# Patient Record
Sex: MALE | Race: WHITE | NOT HISPANIC OR LATINO
[De-identification: names, ages, dates, MRNs, and addresses within clinical notes are randomized per-mention and may not be internally consistent; named-entity substitution may affect disease eponyms.]

---

## 2018-02-20 ENCOUNTER — APPOINTMENT (OUTPATIENT)
Dept: FAMILY MEDICINE | Facility: CLINIC | Age: 58
End: 2018-02-20
Payer: COMMERCIAL

## 2018-02-20 VITALS
BODY MASS INDEX: 31.39 KG/M2 | WEIGHT: 200 LBS | HEART RATE: 88 BPM | DIASTOLIC BLOOD PRESSURE: 90 MMHG | HEIGHT: 67 IN | SYSTOLIC BLOOD PRESSURE: 158 MMHG

## 2018-02-20 DIAGNOSIS — Z87.19 PERSONAL HISTORY OF OTHER DISEASES OF THE DIGESTIVE SYSTEM: ICD-10-CM

## 2018-02-20 DIAGNOSIS — Z82.49 FAMILY HISTORY OF ISCHEMIC HEART DISEASE AND OTHER DISEASES OF THE CIRCULATORY SYSTEM: ICD-10-CM

## 2018-02-20 DIAGNOSIS — Z87.09 PERSONAL HISTORY OF OTHER DISEASES OF THE RESPIRATORY SYSTEM: ICD-10-CM

## 2018-02-20 DIAGNOSIS — H93.19 TINNITUS, UNSPECIFIED EAR: ICD-10-CM

## 2018-02-20 DIAGNOSIS — E66.9 OBESITY, UNSPECIFIED: ICD-10-CM

## 2018-02-20 DIAGNOSIS — J30.2 OTHER SEASONAL ALLERGIC RHINITIS: ICD-10-CM

## 2018-02-20 PROCEDURE — 99204 OFFICE O/P NEW MOD 45 MIN: CPT | Mod: 25

## 2018-02-20 PROCEDURE — 93000 ELECTROCARDIOGRAM COMPLETE: CPT

## 2018-02-21 PROBLEM — E66.9 OBESITY: Status: ACTIVE | Noted: 2018-02-21

## 2018-03-15 ENCOUNTER — APPOINTMENT (OUTPATIENT)
Dept: FAMILY MEDICINE | Facility: CLINIC | Age: 58
End: 2018-03-15
Payer: COMMERCIAL

## 2018-03-15 VITALS
BODY MASS INDEX: 31.55 KG/M2 | SYSTOLIC BLOOD PRESSURE: 130 MMHG | WEIGHT: 201 LBS | HEART RATE: 84 BPM | HEIGHT: 67 IN | DIASTOLIC BLOOD PRESSURE: 80 MMHG

## 2018-03-15 DIAGNOSIS — F41.9 ANXIETY DISORDER, UNSPECIFIED: ICD-10-CM

## 2018-03-15 PROCEDURE — 99215 OFFICE O/P EST HI 40 MIN: CPT | Mod: 25

## 2018-03-15 PROCEDURE — G0444 DEPRESSION SCREEN ANNUAL: CPT | Mod: 59

## 2018-03-15 PROCEDURE — G0447 BEHAVIOR COUNSEL OBESITY 15M: CPT

## 2018-03-15 PROCEDURE — 36415 COLL VENOUS BLD VENIPUNCTURE: CPT

## 2018-03-15 PROCEDURE — G0442 ANNUAL ALCOHOL SCREEN 15 MIN: CPT

## 2018-03-15 PROCEDURE — G0443: CPT

## 2018-03-15 PROCEDURE — 99396 PREV VISIT EST AGE 40-64: CPT | Mod: 25

## 2018-03-16 LAB
25(OH)D3 SERPL-MCNC: 23.8 NG/ML
ALBUMIN SERPL ELPH-MCNC: 4.6 G/DL
ALP BLD-CCNC: 79 U/L
ALT SERPL-CCNC: 57 U/L
ANION GAP SERPL CALC-SCNC: 14 MMOL/L
AST SERPL-CCNC: 32 U/L
BASOPHILS # BLD AUTO: 0.02 K/UL
BASOPHILS NFR BLD AUTO: 0.3 %
BILIRUB SERPL-MCNC: 0.8 MG/DL
BUN SERPL-MCNC: 20 MG/DL
CALCIUM SERPL-MCNC: 9 MG/DL
CHLORIDE SERPL-SCNC: 102 MMOL/L
CHOLEST SERPL-MCNC: 211 MG/DL
CHOLEST/HDLC SERPL: 5.4 RATIO
CO2 SERPL-SCNC: 27 MMOL/L
CREAT SERPL-MCNC: 1.12 MG/DL
EOSINOPHIL # BLD AUTO: 0.1 K/UL
EOSINOPHIL NFR BLD AUTO: 1.4 %
FERRITIN SERPL-MCNC: 627 NG/ML
FOLATE SERPL-MCNC: 14.3 NG/ML
GLUCOSE SERPL-MCNC: 98 MG/DL
HBA1C MFR BLD HPLC: 5.8 %
HCT VFR BLD CALC: 45.4 %
HDLC SERPL-MCNC: 39 MG/DL
HGB BLD-MCNC: 15.5 G/DL
IMM GRANULOCYTES NFR BLD AUTO: 0.9 %
IRON SATN MFR SERPL: 32 %
IRON SERPL-MCNC: 91 UG/DL
LDLC SERPL CALC-MCNC: 133 MG/DL
LYMPHOCYTES # BLD AUTO: 1.93 K/UL
LYMPHOCYTES NFR BLD AUTO: 27.9 %
MAN DIFF?: NORMAL
MCHC RBC-ENTMCNC: 31.8 PG
MCHC RBC-ENTMCNC: 34.1 GM/DL
MCV RBC AUTO: 93 FL
MONOCYTES # BLD AUTO: 0.65 K/UL
MONOCYTES NFR BLD AUTO: 9.4 %
NEUTROPHILS # BLD AUTO: 4.15 K/UL
NEUTROPHILS NFR BLD AUTO: 60.1 %
PLATELET # BLD AUTO: 204 K/UL
POTASSIUM SERPL-SCNC: 4.3 MMOL/L
PROT SERPL-MCNC: 7.2 G/DL
PSA FREE FLD-MCNC: 21.6
PSA FREE SERPL-MCNC: 0.25 NG/ML
PSA SERPL-MCNC: 1.16 NG/ML
RBC # BLD: 4.88 M/UL
RBC # FLD: 12.9 %
SODIUM SERPL-SCNC: 143 MMOL/L
T4 FREE SERPL-MCNC: 1.2 NG/DL
TIBC SERPL-MCNC: 286 UG/DL
TRIGL SERPL-MCNC: 193 MG/DL
TSH SERPL-ACNC: 1.34 UIU/ML
UIBC SERPL-MCNC: 195 UG/DL
VIT B12 SERPL-MCNC: 1001 PG/ML
WBC # FLD AUTO: 6.91 K/UL

## 2018-03-26 ENCOUNTER — APPOINTMENT (OUTPATIENT)
Dept: ORTHOPEDIC SURGERY | Facility: CLINIC | Age: 58
End: 2018-03-26
Payer: COMMERCIAL

## 2018-03-26 VITALS
BODY MASS INDEX: 31.39 KG/M2 | HEART RATE: 99 BPM | HEIGHT: 67 IN | DIASTOLIC BLOOD PRESSURE: 103 MMHG | TEMPERATURE: 98.9 F | SYSTOLIC BLOOD PRESSURE: 188 MMHG | WEIGHT: 200 LBS

## 2018-03-26 DIAGNOSIS — M16.11 UNILATERAL PRIMARY OSTEOARTHRITIS, RIGHT HIP: ICD-10-CM

## 2018-03-26 DIAGNOSIS — Z96.642 PRESENCE OF LEFT ARTIFICIAL HIP JOINT: ICD-10-CM

## 2018-03-26 DIAGNOSIS — Z47.1 AFTERCARE FOLLOWING JOINT REPLACEMENT SURGERY: ICD-10-CM

## 2018-03-26 DIAGNOSIS — Z96.642 AFTERCARE FOLLOWING JOINT REPLACEMENT SURGERY: ICD-10-CM

## 2018-03-26 PROCEDURE — 99213 OFFICE O/P EST LOW 20 MIN: CPT

## 2018-03-26 PROCEDURE — 73502 X-RAY EXAM HIP UNI 2-3 VIEWS: CPT | Mod: LT

## 2018-03-27 ENCOUNTER — TRANSCRIPTION ENCOUNTER (OUTPATIENT)
Age: 58
End: 2018-03-27

## 2018-05-15 ENCOUNTER — APPOINTMENT (OUTPATIENT)
Dept: ORTHOPEDIC SURGERY | Facility: CLINIC | Age: 58
End: 2018-05-15
Payer: COMMERCIAL

## 2018-05-15 VITALS
BODY MASS INDEX: 31.39 KG/M2 | HEIGHT: 67 IN | SYSTOLIC BLOOD PRESSURE: 160 MMHG | HEART RATE: 100 BPM | WEIGHT: 200 LBS | DIASTOLIC BLOOD PRESSURE: 93 MMHG

## 2018-05-15 DIAGNOSIS — M75.52 BURSITIS OF LEFT SHOULDER: ICD-10-CM

## 2018-05-15 DIAGNOSIS — M77.11 LATERAL EPICONDYLITIS, RIGHT ELBOW: ICD-10-CM

## 2018-05-15 DIAGNOSIS — M25.521 PAIN IN RIGHT ELBOW: ICD-10-CM

## 2018-05-15 PROCEDURE — 99215 OFFICE O/P EST HI 40 MIN: CPT | Mod: 25

## 2018-05-15 PROCEDURE — 20610 DRAIN/INJ JOINT/BURSA W/O US: CPT | Mod: LT

## 2018-05-15 PROCEDURE — 73030 X-RAY EXAM OF SHOULDER: CPT | Mod: LT

## 2018-05-15 PROCEDURE — 20551 NJX 1 TENDON ORIGIN/INSJ: CPT | Mod: 59,RT

## 2018-07-10 ENCOUNTER — APPOINTMENT (OUTPATIENT)
Dept: FAMILY MEDICINE | Facility: CLINIC | Age: 58
End: 2018-07-10
Payer: COMMERCIAL

## 2018-07-10 VITALS
WEIGHT: 195 LBS | DIASTOLIC BLOOD PRESSURE: 95 MMHG | BODY MASS INDEX: 30.61 KG/M2 | SYSTOLIC BLOOD PRESSURE: 180 MMHG | HEART RATE: 106 BPM | HEIGHT: 67 IN

## 2018-07-10 PROCEDURE — 99214 OFFICE O/P EST MOD 30 MIN: CPT

## 2018-07-10 NOTE — HEALTH RISK ASSESSMENT
[Handling Complex Tasks] : difficulty handling complex tasks [None] : None [With Family] : lives with family [Employed] : employed [Feels Safe at Home] : Feels safe at home [Fully functional (bathing, dressing, toileting, transferring, walking, feeding)] : Fully functional (bathing, dressing, toileting, transferring, walking, feeding) [Fully functional (using the telephone, shopping, preparing meals, housekeeping, doing laundry, using] : Fully functional and needs no help or supervision to perform IADLs (using the telephone, shopping, preparing meals, housekeeping, doing laundry, using transportation, managing medications and managing finances) [Change in mental status noted] : No change in mental status noted [Language] : denies difficulty with language [Reports changes in hearing] : Reports no changes in hearing [Reports changes in dental health] : Reports no changes in dental health [ColonoscopyComments] : never

## 2018-07-10 NOTE — ASSESSMENT
[FreeTextEntry1] : uncontrolled  HTN- compliance discussed/decrease salty food/ increase amlodipine to 10 mg and add carvedilol to existing meds/ follow up in 2 weeks\par screen for colon ca- FIT/ GE referral- advsie to send it in.\par screen for prostates ca- PSAral\par alcohol drinker- drinks 4-5 /day advise to cut down no more then 2/day/discussed effects of alcohol on liver sathish with high ferritin and elevated lft\par elevated ferritin- Hematology consult\par prediabetes/ hyperlipedemia- advise die\par elevated LFT- US and refrain from alcohol\par Diet and Activity: - Choose lean meats and poultry without skin and prepare them without added saturated and trans \par fat. Eat fish at least,twice a week. Recent research shows that eating oily \par fish containing omega-3,fatty acids (for example, salmon, trout and herring) \par may help lower your risk of death from coronary artery disease. Select \par fat-free, 1 percent fat and low-fat dairy products. Cut back on foods \par containing partially hydrogenated vegetable oils to reduce trans fat in your \par diet. To lower cholesterol, reduce saturated fat to no more than 5 to 6 \par percent of total calories. For someone eating 2,000 calories a day, that’s \par about 13 grams of saturated fat. Cut back on beverages and foods with added \par sugars. Choose and prepare foods with little or no salt. To lower blood \par pressure, aim to eat no more than 2,400 milligrams of sodium per day. \par Reducing daily intake to 1,500 mg is desirable because it can lower blood \par pressure even further. If you drink alcohol, drink in moderation. That means \par one drink per day if youre a woman and two drinks per day if youre a \par man Follow the American Heart Association recommendations when you eat out, \par and keep an eye on your portion sizes.\par follow up in 3 months /prn

## 2018-07-10 NOTE — COUNSELING
[Weight management counseling provided] : Weight management [Healthy eating counseling provided] : healthy eating [Activity counseling provided] : activity [Behavioral health counseling provided] : behavioral health

## 2018-07-22 PROBLEM — J30.2 SEASONAL ALLERGIES: Status: ACTIVE | Noted: 2018-02-20

## 2018-07-24 ENCOUNTER — APPOINTMENT (OUTPATIENT)
Dept: FAMILY MEDICINE | Facility: CLINIC | Age: 58
End: 2018-07-24
Payer: COMMERCIAL

## 2018-07-24 VITALS
DIASTOLIC BLOOD PRESSURE: 100 MMHG | WEIGHT: 200 LBS | HEIGHT: 67 IN | SYSTOLIC BLOOD PRESSURE: 168 MMHG | HEART RATE: 98 BPM | BODY MASS INDEX: 31.39 KG/M2

## 2018-07-24 PROCEDURE — 99213 OFFICE O/P EST LOW 20 MIN: CPT

## 2018-07-24 NOTE — HISTORY OF PRESENT ILLNESS
[FreeTextEntry1] : HTN follow up [de-identified] : no complaints\par has h/o high LFt in the past\par drinks 4-5 drinks/day\par h/o high Bilirubin as well\par \par here for BP follow up eats out and eats salty food

## 2018-08-14 ENCOUNTER — APPOINTMENT (OUTPATIENT)
Dept: FAMILY MEDICINE | Facility: CLINIC | Age: 58
End: 2018-08-14
Payer: COMMERCIAL

## 2018-08-14 VITALS
HEIGHT: 67 IN | HEART RATE: 84 BPM | DIASTOLIC BLOOD PRESSURE: 88 MMHG | SYSTOLIC BLOOD PRESSURE: 138 MMHG | BODY MASS INDEX: 32.02 KG/M2 | WEIGHT: 204 LBS

## 2018-08-14 DIAGNOSIS — R79.89 OTHER SPECIFIED ABNORMAL FINDINGS OF BLOOD CHEMISTRY: ICD-10-CM

## 2018-08-14 PROCEDURE — 99214 OFFICE O/P EST MOD 30 MIN: CPT

## 2018-08-14 NOTE — ASSESSMENT
[FreeTextEntry1] :   HTN- better controlled continue current.\par compliance discussed/decrease salty food/ increase amlodipine to 10 mg and add carvedilol to existing meds/ follow up in 2 weeks\par screen for colon ca- FIT/ GE referral- advsie to send it in- advised to make appts.\par screen for prostates ca- PSAral\par alcohol drinker- drinks 4-5 /day advise to cut down no more then 2/day/discussed effects of alcohol on liver sathish with high ferritin and elevated lft- reiterated again- will draw lab.\par elevated ferritin- Hematology consult\par prediabetes/ hyperlipedemia- advise die\par elevated LFT- US and refrain from alcohol\par Diet and Activity: - Choose lean meats and poultry without skin and prepare them without added saturated and trans \par fat. Eat fish at least,twice a week. Recent research shows that eating oily \par fish containing omega-3,fatty acids (for example, salmon, trout and herring) \par may help lower your risk of death from coronary artery disease. Select \par fat-free, 1 percent fat and low-fat dairy products. Cut back on foods \par containing partially hydrogenated vegetable oils to reduce trans fat in your \par diet. To lower cholesterol, reduce saturated fat to no more than 5 to 6 \par percent of total calories. For someone eating 2,000 calories a day, that’s \par about 13 grams of saturated fat. Cut back on beverages and foods with added \par sugars. Choose and prepare foods with little or no salt. To lower blood \par pressure, aim to eat no more than 2,400 milligrams of sodium per day. \par Reducing daily intake to 1,500 mg is desirable because it can lower blood \par pressure even further. If you drink alcohol, drink in moderation. That means \par one drink per day if youre a woman and two drinks per day if youre a \par man Follow the American Heart Association recommendations when you eat out, \par and keep an eye on your portion sizes.\par follow up in 3 months /prn

## 2018-08-14 NOTE — HISTORY OF PRESENT ILLNESS
[FreeTextEntry1] : here for BP follow up [de-identified] : no complaints\par has h/o high LFt in the past\par drinks 4-5 drinks/day\par h/o high Bilirubin as well\par 07/2018- here for BP follow up\par declines any chest pain/headache or SOB\par did not take meds yet.\par 08/2018- here for BP follow up /has cut down on salt and doing well but continues to drink 6 beers/day may be more on the weekened. feels its not much and he is doing Okay.\par has not seen cardio/gastro and Not done FIT testing\par

## 2018-09-13 LAB — HEMOCCULT STL QL IA: NEGATIVE

## 2018-10-10 ENCOUNTER — RX RENEWAL (OUTPATIENT)
Age: 58
End: 2018-10-10

## 2018-10-16 ENCOUNTER — APPOINTMENT (OUTPATIENT)
Dept: FAMILY MEDICINE | Facility: CLINIC | Age: 58
End: 2018-10-16
Payer: COMMERCIAL

## 2018-10-16 VITALS
HEIGHT: 67 IN | HEART RATE: 80 BPM | SYSTOLIC BLOOD PRESSURE: 146 MMHG | DIASTOLIC BLOOD PRESSURE: 90 MMHG | BODY MASS INDEX: 32.02 KG/M2 | WEIGHT: 204 LBS

## 2018-10-16 PROCEDURE — 99214 OFFICE O/P EST MOD 30 MIN: CPT

## 2018-10-16 NOTE — ASSESSMENT
[FreeTextEntry1] :   HTN- un controlled - increase amlodipine to 10 mg and continue rest/ advise lab and follow up in3 months\par compliance discussed/decrease salty food/ increase amlodipine to 10 mg and add carvedilol to existing meds/ follow up in 2 weeks\par screen for colon ca- FIT/ GE referral- advsie to send it in- advised to make appts.\par screen for prostates ca- PSAral\par alcohol drinker- drinks 4-5 /day advise to cut down no more then 2/day/discussed effects of alcohol on liver sathish with high ferritin and elevated lft- reiterated again- will draw lab.\par elevated ferritin- Hematology consult\par prediabetes/ hyperlipedemia- advise die\par elevated LFT- US and refrain from alcohol\par Diet and Activity: - Choose lean meats and poultry without skin and prepare them without added saturated and trans \par fat. Eat fish at least,twice a week. Recent research shows that eating oily \par fish containing omega-3,fatty acids (for example, salmon, trout and herring) \par may help lower your risk of death from coronary artery disease. Select \par fat-free, 1 percent fat and low-fat dairy products. Cut back on foods \par containing partially hydrogenated vegetable oils to reduce trans fat in your \par diet. To lower cholesterol, reduce saturated fat to no more than 5 to 6 \par percent of total calories. For someone eating 2,000 calories a day, that’s \par about 13 grams of saturated fat. Cut back on beverages and foods with added \par sugars. Choose and prepare foods with little or no salt. To lower blood \par pressure, aim to eat no more than 2,400 milligrams of sodium per day. \par Reducing daily intake to 1,500 mg is desirable because it can lower blood \par pressure even further. If you drink alcohol, drink in moderation. That means \par one drink per day if youre a woman and two drinks per day if youre a \par man Follow the American Heart Association recommendations when you eat out, \par and keep an eye on your portion sizes.\par follow up in 3 months /prn

## 2018-10-16 NOTE — HEALTH RISK ASSESSMENT
[Change in mental status noted] : No change in mental status noted [Language] : denies difficulty with language [Handling Complex Tasks] : difficulty handling complex tasks [None] : None [With Family] : lives with family [Employed] : employed [Feels Safe at Home] : Feels safe at home [Fully functional (bathing, dressing, toileting, transferring, walking, feeding)] : Fully functional (bathing, dressing, toileting, transferring, walking, feeding) [Fully functional (using the telephone, shopping, preparing meals, housekeeping, doing laundry, using] : Fully functional and needs no help or supervision to perform IADLs (using the telephone, shopping, preparing meals, housekeeping, doing laundry, using transportation, managing medications and managing finances) [Reports changes in hearing] : Reports no changes in hearing [Reports changes in dental health] : Reports no changes in dental health [ColonoscopyComments] : never

## 2018-10-16 NOTE — HISTORY OF PRESENT ILLNESS
[FreeTextEntry1] : here for BP follow up/ has not done lab [de-identified] : no complaints\par has h/o high LFt in the past\par drinks 4-5 drinks/day\par h/o high Bilirubin as well\par 07/2018- here for BP follow up\par declines any chest pain/headache or SOB\par did not take meds yet.\par 08/2018- here for BP follow up /has cut down on salt and doing well but continues to drink 6 beers/day may be more on the weekened. feels its not much and he is doing Okay.\par has not seen cardio/gastro and Not done FIT testing\par 10/2018- no changes

## 2019-01-22 ENCOUNTER — APPOINTMENT (OUTPATIENT)
Dept: GASTROENTEROLOGY | Facility: CLINIC | Age: 59
End: 2019-01-22
Payer: COMMERCIAL

## 2019-01-22 VITALS
DIASTOLIC BLOOD PRESSURE: 84 MMHG | HEIGHT: 67 IN | RESPIRATION RATE: 16 BRPM | SYSTOLIC BLOOD PRESSURE: 136 MMHG | OXYGEN SATURATION: 100 % | HEART RATE: 72 BPM

## 2019-01-22 DIAGNOSIS — Z78.9 OTHER SPECIFIED HEALTH STATUS: ICD-10-CM

## 2019-01-22 PROCEDURE — 99204 OFFICE O/P NEW MOD 45 MIN: CPT

## 2019-01-22 NOTE — HISTORY OF PRESENT ILLNESS
[Wt Loss ___ Lbs] : no recent weight loss [de-identified] : This is a 58-year-old man with a past medical history of hypertension, prediabetes, and obesity who presents for evaluation for a screening colonoscopy.  The patient has never undergone a colonoscopy.  He denies any family history of colorectal cancer.  He denies any unintentional weight loss, rectal bleeding, abdominal pain, nausea, vomiting, diarrhea, or constipation.  He denies any changes in his bowel habits.  He does report occasional bloating.  He admits to drinking approximately 4-5 drinks nightly.  He has been told by other physicians that he needs to cut down on his alcohol consumption.

## 2019-01-22 NOTE — ASSESSMENT
[FreeTextEntry1] : CRC screening: The description of the colonoscopy procedure was discussed in depth as were the alternatives and risks, the alternatives including a barium enema, a CT scan, a CT colonography, or stool testing (hemoccult/FIT).  It was explained that although these alternatives may be useful and may give general information about problems within the colon, but they do not provide the in-depth information, direct visibility and the ability to resect polyps as a colonoscopy does.  The risks were thoroughly described and may include but are not limited to: bleeding (immediate or up to 14 days after the procedure), missed polyps and/or cancer that may not be seen during the procedure, rectal irritation, medication reaction (to sedation, or any other medications administered), irritation of the vein used for IV medication, infection, tear or perforation of the colon and rectum, abdominal bloating and cramping.  Additionally discussed were rare complications that may require additional treatment such as surgery, hospitalization, repeat endoscopy and/or blood transfusion.  Lastly, mentioned is a small risk of cardiopulmonary events such as loss of breathing and heart rhythm disturbances including rare cardiopulmonary arrest.\par \par Alcohol abuse/elevated LFTs: Counseled patient to significantly reduce alcohol intake.  He reported he was aware he drinks too much and intends to decrease his consumption.

## 2019-01-22 NOTE — CONSULT LETTER
[Dear  ___] : Dear  [unfilled], [Consult Letter:] : I had the pleasure of evaluating your patient, [unfilled]. [Please see my note below.] : Please see my note below. [Consult Closing:] : Thank you very much for allowing me to participate in the care of this patient.  If you have any questions, please do not hesitate to contact me. [FreeTextEntry3] : Very truly yours,\par \par KIMBERLY Riggins MD\par [facility]\par Gastroenterology at [place]\par [address], [address]\[place] [address]\par Tel [phone]\par Fax [phone]

## 2019-01-22 NOTE — PHYSICAL EXAM
[General Appearance - Alert] : alert [General Appearance - In No Acute Distress] : in no acute distress [Sclera] : the sclera and conjunctiva were normal [PERRL With Normal Accommodation] : pupils were equal in size, round, and reactive to light [Extraocular Movements] : extraocular movements were intact [Outer Ear] : the ears and nose were normal in appearance [Oropharynx] : the oropharynx was normal [Neck Appearance] : the appearance of the neck was normal [Neck Cervical Mass (___cm)] : no neck mass was observed [Jugular Venous Distention Increased] : there was no jugular-venous distention [Thyroid Diffuse Enlargement] : the thyroid was not enlarged [Thyroid Nodule] : there were no palpable thyroid nodules [] : no respiratory distress [Auscultation Breath Sounds / Voice Sounds] : lungs were clear to auscultation bilaterally [Heart Rate And Rhythm] : heart rate was normal and rhythm regular [Heart Sounds] : normal S1 and S2 [Heart Sounds Gallop] : no gallops [Murmurs] : no murmurs [Heart Sounds Pericardial Friction Rub] : no pericardial rub [Edema] : there was no peripheral edema [Bowel Sounds] : normal bowel sounds [Abdomen Soft] : soft [Abdomen Tenderness] : non-tender [Abdomen Mass (___ Cm)] : no abdominal mass palpated [Cervical Lymph Nodes Enlarged Posterior Bilaterally] : posterior cervical [Cervical Lymph Nodes Enlarged Anterior Bilaterally] : anterior cervical [Supraclavicular Lymph Nodes Enlarged Bilaterally] : supraclavicular [Skin Color & Pigmentation] : normal skin color and pigmentation [Skin Turgor] : normal skin turgor [No Focal Deficits] : no focal deficits [Oriented To Time, Place, And Person] : oriented to person, place, and time [Impaired Insight] : insight and judgment were intact [Affect] : the affect was normal [FreeTextEntry1] : liver edge palpable approximately 1.5 cm below the costophrenic margin; spleen not palpable

## 2019-01-28 ENCOUNTER — RX RENEWAL (OUTPATIENT)
Age: 59
End: 2019-01-28

## 2019-01-31 ENCOUNTER — APPOINTMENT (OUTPATIENT)
Dept: FAMILY MEDICINE | Facility: CLINIC | Age: 59
End: 2019-01-31
Payer: COMMERCIAL

## 2019-01-31 VITALS
BODY MASS INDEX: 32.33 KG/M2 | HEIGHT: 67 IN | HEART RATE: 76 BPM | DIASTOLIC BLOOD PRESSURE: 78 MMHG | WEIGHT: 206 LBS | SYSTOLIC BLOOD PRESSURE: 128 MMHG

## 2019-01-31 DIAGNOSIS — R73.03 PREDIABETES.: ICD-10-CM

## 2019-01-31 PROCEDURE — 99214 OFFICE O/P EST MOD 30 MIN: CPT

## 2019-01-31 NOTE — HISTORY OF PRESENT ILLNESS
[FreeTextEntry1] : here for BP follow up/ has not done lab [de-identified] : no complaints\par has h/o high LFt in the past\par drinks 4-5 drinks/day\par h/o high Bilirubin as well\par 07/2018- here for BP follow up\par declines any chest pain/headache or SOB\par did not take meds yet.\par 08/2018- here for BP follow up /has cut down on salt and doing well but continues to drink 6 beers/day may be more on the weekened. feels its not much and he is doing Okay.\par has not seen cardio/gastro and Not done FIT testing\par 10/2018- no changes\par 01/2019- feels okay/ cutting down alcohol\par saw gastro

## 2019-01-31 NOTE — ASSESSMENT
[FreeTextEntry1] :   HTN-  controlled - continue amlodipine to 10 mg and continue rest/ advise lab and follow up in3 months\par compliance discussed/decrease salty food/ increase amlodipine to 10 mg and add carvedilol to existing meds/ follow up in 2 weeks\par screen for colon ca- FIT/ GE referral- advsie to send it in- advised to make appts.\par screen for prostates ca- PSAral\par alcohol drinker- drinks 4-5 /day advise to cut down no more then 2/day/discussed effects of alcohol on liver sathish with high ferritin and elevated lft- reiterated again- will draw lab.\par elevated ferritin- Hematology consult\par prediabetes/ hyperlipedemia- advise die\par elevated LFT- US and refrain from alcohol\par Diet and Activity: - Choose lean meats and poultry without skin and prepare them without added saturated and trans \par fat. Eat fish at least,twice a week. Recent research shows that eating oily \par fish containing omega-3,fatty acids (for example, salmon, trout and herring) \par may help lower your risk of death from coronary artery disease. Select \par fat-free, 1 percent fat and low-fat dairy products. Cut back on foods \par containing partially hydrogenated vegetable oils to reduce trans fat in your \par diet. To lower cholesterol, reduce saturated fat to no more than 5 to 6 \par percent of total calories. For someone eating 2,000 calories a day, that’s \par about 13 grams of saturated fat. Cut back on beverages and foods with added \par sugars. Choose and prepare foods with little or no salt. To lower blood \par pressure, aim to eat no more than 2,400 milligrams of sodium per day. \par Reducing daily intake to 1,500 mg is desirable because it can lower blood \par pressure even further. If you drink alcohol, drink in moderation. That means \par one drink per day if youre a woman and two drinks per day if youre a \par man Follow the American Heart Association recommendations when you eat out, \par and keep an eye on your portion sizes.\par follow up in 3 months /prn

## 2019-02-15 ENCOUNTER — APPOINTMENT (OUTPATIENT)
Dept: GASTROENTEROLOGY | Facility: GI CENTER | Age: 59
End: 2019-02-15

## 2019-02-22 ENCOUNTER — APPOINTMENT (OUTPATIENT)
Dept: GASTROENTEROLOGY | Facility: GI CENTER | Age: 59
End: 2019-02-22
Payer: COMMERCIAL

## 2019-02-22 ENCOUNTER — RESULT REVIEW (OUTPATIENT)
Age: 59
End: 2019-02-22

## 2019-02-22 ENCOUNTER — OUTPATIENT (OUTPATIENT)
Dept: OUTPATIENT SERVICES | Facility: HOSPITAL | Age: 59
LOS: 1 days | End: 2019-02-22
Payer: COMMERCIAL

## 2019-02-22 DIAGNOSIS — D12.2 BENIGN NEOPLASM OF ASCENDING COLON: ICD-10-CM

## 2019-02-22 DIAGNOSIS — K64.4 RESIDUAL HEMORRHOIDAL SKIN TAGS: ICD-10-CM

## 2019-02-22 DIAGNOSIS — Z12.11 ENCOUNTER FOR SCREENING FOR MALIGNANT NEOPLASM OF COLON: ICD-10-CM

## 2019-02-22 PROCEDURE — 45385 COLONOSCOPY W/LESION REMOVAL: CPT

## 2019-02-22 PROCEDURE — 88305 TISSUE EXAM BY PATHOLOGIST: CPT | Mod: 26

## 2019-02-22 PROCEDURE — 88305 TISSUE EXAM BY PATHOLOGIST: CPT

## 2019-02-22 RX ORDER — SODIUM SULFATE, POTASSIUM SULFATE, MAGNESIUM SULFATE 17.5; 3.13; 1.6 G/ML; G/ML; G/ML
17.5-3.13-1.6 SOLUTION, CONCENTRATE ORAL
Qty: 1 | Refills: 0 | Status: COMPLETED | COMMUNITY
Start: 2019-01-22 | End: 2019-02-22

## 2019-02-22 NOTE — PHYSICAL EXAM
[General Appearance - Alert] : alert [General Appearance - In No Acute Distress] : in no acute distress [Sclera] : the sclera and conjunctiva were normal [PERRL With Normal Accommodation] : pupils were equal in size, round, and reactive to light [Extraocular Movements] : extraocular movements were intact [Outer Ear] : the ears and nose were normal in appearance [Oropharynx] : the oropharynx was normal [Neck Appearance] : the appearance of the neck was normal [Neck Cervical Mass (___cm)] : no neck mass was observed [Jugular Venous Distention Increased] : there was no jugular-venous distention [Thyroid Diffuse Enlargement] : the thyroid was not enlarged [Thyroid Nodule] : there were no palpable thyroid nodules [] : no respiratory distress [Auscultation Breath Sounds / Voice Sounds] : lungs were clear to auscultation bilaterally [Heart Rate And Rhythm] : heart rate was normal and rhythm regular [Heart Sounds] : normal S1 and S2 [Heart Sounds Gallop] : no gallops [Murmurs] : no murmurs [Heart Sounds Pericardial Friction Rub] : no pericardial rub [Edema] : there was no peripheral edema [Bowel Sounds] : normal bowel sounds [Abdomen Soft] : soft [Abdomen Tenderness] : non-tender [Abdomen Mass (___ Cm)] : no abdominal mass palpated [FreeTextEntry1] : liver edge palpable approximately 1.5 cm below the costophrenic margin; spleen not palpable [Cervical Lymph Nodes Enlarged Posterior Bilaterally] : posterior cervical [Cervical Lymph Nodes Enlarged Anterior Bilaterally] : anterior cervical [Supraclavicular Lymph Nodes Enlarged Bilaterally] : supraclavicular [Skin Color & Pigmentation] : normal skin color and pigmentation [Skin Turgor] : normal skin turgor [No Focal Deficits] : no focal deficits [Oriented To Time, Place, And Person] : oriented to person, place, and time [Impaired Insight] : insight and judgment were intact [Affect] : the affect was normal

## 2019-02-22 NOTE — ASSESSMENT
[FreeTextEntry1] : One small polyp resected and retrieved by cold snare polypectomy.  External hemorrhoids on KARIN and retroflexed view.  Poor bowel prep.\par \par Follow up pathology, repeat colonoscopy in 1 year due to poor prep.

## 2019-02-22 NOTE — PROCEDURE
[With Snare Polypectomy] : snare polypectomy [Colon Cancer Screening] : colon cancer screening [Procedure Explained] : The procedure was explained [Allergies Reviewed] : allergies reviewed. [Risks] : Risks [Benefits] : benefits [Alternatives] : alternatives [Bleeding] : bleeding risk [Infection] : risk of infection [Consent Obtained] : written consent was obtained prior to the procedure and is detailed in the patient's record [Patient] : the patient [Bowel Prep Kit] : the patient took the appropriate bowel preparation kit as directed [Approved Diet Followed] : the patient avoided solid foods and adhered to the approved diet list for 24 hours prior to the procedure [Automated Blood Pressure Cuff] : automated blood pressure cuff [Cardiac Monitor] : cardiac monitor [Pulse Oximeter] : pulse oximeter [Propofol ___ mg IV] : Propofol [unfilled] ~Umg intravenously [2] : 2 [Sedation Clearance] : the patient was cleared for moderate sedation [Prep Qualtiy: ___] : Prep Quality:  [unfilled] [Withdrawal Time: ___] : Withdrawal Time:  [unfilled] [Performed By: ___] : Performed by:  JACOBO [Left Lateral Decubitus] : The patient was positioned in the left lateral decubitus position [External Hemorrhoids] : external hemorrhoids [Normal Prostate] : a normal prostate [Cecum (Landmarks)] : and guided to the cecum which was identified by the anatomic landmarks of the appendiceal orifice and ileocecal valve [Minimal Difficulty] : with minimal difficulty [Insufflated] : insufflated [Single Pass Needed] : after a single pass [Retroflex View] : a retroflex view of the rectum was performed [Polyps] : polyps [Cold Snare Polypectomy] : cold snare polypectomy [Normal] : Normal [Hemorrhoids] : hemorrhoids [Sent to Pathology] : was sent to pathology for analysis [Tolerated Well] : the patient tolerated the procedure well [Vital Signs Stable] : the vital signs were stable [No Complications] : There were no complications [Abnormal Rectum] : a normal rectum [de-identified] : 4 mm sessile polyp resected and retrieved by cold snare polypectomy.

## 2019-02-22 NOTE — PROCEDURE
[With Snare Polypectomy] : snare polypectomy [Colon Cancer Screening] : colon cancer screening [Procedure Explained] : The procedure was explained [Allergies Reviewed] : allergies reviewed. [Risks] : Risks [Benefits] : benefits [Alternatives] : alternatives [Bleeding] : bleeding risk [Infection] : risk of infection [Consent Obtained] : written consent was obtained prior to the procedure and is detailed in the patient's record [Patient] : the patient [Bowel Prep Kit] : the patient took the appropriate bowel preparation kit as directed [Approved Diet Followed] : the patient avoided solid foods and adhered to the approved diet list for 24 hours prior to the procedure [Automated Blood Pressure Cuff] : automated blood pressure cuff [Cardiac Monitor] : cardiac monitor [Pulse Oximeter] : pulse oximeter [Propofol ___ mg IV] : Propofol [unfilled] ~Umg intravenously [2] : 2 [Sedation Clearance] : the patient was cleared for moderate sedation [Prep Qualtiy: ___] : Prep Quality:  [unfilled] [Withdrawal Time: ___] : Withdrawal Time:  [unfilled] [Performed By: ___] : Performed by:  JACOBO [Left Lateral Decubitus] : The patient was positioned in the left lateral decubitus position [External Hemorrhoids] : external hemorrhoids [Normal Prostate] : a normal prostate [Cecum (Landmarks)] : and guided to the cecum which was identified by the anatomic landmarks of the appendiceal orifice and ileocecal valve [Minimal Difficulty] : with minimal difficulty [Insufflated] : insufflated [Single Pass Needed] : after a single pass [Retroflex View] : a retroflex view of the rectum was performed [Polyps] : polyps [Cold Snare Polypectomy] : cold snare polypectomy [Normal] : Normal [Hemorrhoids] : hemorrhoids [Sent to Pathology] : was sent to pathology for analysis [Tolerated Well] : the patient tolerated the procedure well [Vital Signs Stable] : the vital signs were stable [No Complications] : There were no complications [Abnormal Rectum] : a normal rectum [de-identified] : 4 mm sessile polyp resected and retrieved by cold snare polypectomy.

## 2019-02-25 ENCOUNTER — RESULT REVIEW (OUTPATIENT)
Age: 59
End: 2019-02-25

## 2019-02-25 LAB
ALBUMIN SERPL ELPH-MCNC: 4.6 G/DL
ALP BLD-CCNC: 66 U/L
ALT SERPL-CCNC: 48 U/L
ANION GAP SERPL CALC-SCNC: 12 MMOL/L
AST SERPL-CCNC: 29 U/L
BASOPHILS # BLD AUTO: 0.01 K/UL
BASOPHILS NFR BLD AUTO: 0.1 %
BILIRUB SERPL-MCNC: 1.3 MG/DL
BUN SERPL-MCNC: 22 MG/DL
CALCIUM SERPL-MCNC: 9 MG/DL
CHLORIDE SERPL-SCNC: 103 MMOL/L
CHOLEST SERPL-MCNC: 204 MG/DL
CHOLEST/HDLC SERPL: 4.1 RATIO
CO2 SERPL-SCNC: 29 MMOL/L
CREAT SERPL-MCNC: 1.21 MG/DL
EOSINOPHIL # BLD AUTO: 0.12 K/UL
EOSINOPHIL NFR BLD AUTO: 1.6 %
GLUCOSE SERPL-MCNC: 108 MG/DL
HBA1C MFR BLD HPLC: 5.7 %
HCT VFR BLD CALC: 46.1 %
HDLC SERPL-MCNC: 50 MG/DL
HGB BLD-MCNC: 15.1 G/DL
IMM GRANULOCYTES NFR BLD AUTO: 0.4 %
LDLC SERPL CALC-MCNC: 126 MG/DL
LYMPHOCYTES # BLD AUTO: 1.95 K/UL
LYMPHOCYTES NFR BLD AUTO: 25.6 %
MAN DIFF?: NORMAL
MCHC RBC-ENTMCNC: 31.9 PG
MCHC RBC-ENTMCNC: 32.8 GM/DL
MCV RBC AUTO: 97.3 FL
MONOCYTES # BLD AUTO: 0.72 K/UL
MONOCYTES NFR BLD AUTO: 9.5 %
NEUTROPHILS # BLD AUTO: 4.78 K/UL
NEUTROPHILS NFR BLD AUTO: 62.8 %
PLATELET # BLD AUTO: 212 K/UL
POTASSIUM SERPL-SCNC: 3.8 MMOL/L
PROT SERPL-MCNC: 7 G/DL
RBC # BLD: 4.74 M/UL
RBC # FLD: 13.2 %
SODIUM SERPL-SCNC: 144 MMOL/L
TRIGL SERPL-MCNC: 142 MG/DL
TSH SERPL-ACNC: 1.86 UIU/ML
WBC # FLD AUTO: 7.61 K/UL

## 2019-02-28 LAB — SURGICAL PATHOLOGY STUDY: SIGNIFICANT CHANGE UP

## 2019-03-01 ENCOUNTER — OTHER (OUTPATIENT)
Age: 59
End: 2019-03-01

## 2019-03-03 ENCOUNTER — TRANSCRIPTION ENCOUNTER (OUTPATIENT)
Age: 59
End: 2019-03-03

## 2019-03-11 ENCOUNTER — APPOINTMENT (OUTPATIENT)
Dept: ORTHOPEDIC SURGERY | Facility: CLINIC | Age: 59
End: 2019-03-11
Payer: COMMERCIAL

## 2019-03-11 PROCEDURE — 73030 X-RAY EXAM OF SHOULDER: CPT | Mod: LT

## 2019-03-11 PROCEDURE — 99214 OFFICE O/P EST MOD 30 MIN: CPT

## 2019-03-11 NOTE — ASSESSMENT
[FreeTextEntry1] : Patient is a 58-year-old male who's had 15 months worth of left shoulder pain who is doing better after an injection, although did not obtain complete relief. Pain has gotten significantly worse since he fell onto the left shoulder 5 weeks ago. Patient has signs and symptoms consistent with rotator cuff tear.Discussed findings and diagnosis and the risks, benefits, and alternatives of all treatment options. Patient will try a course of meloxicam; risks were discussed. Additionally have ordered an MRI to better evaluate his rotator cuff and he will followup after MRI.

## 2019-03-11 NOTE — CONSULT LETTER
[Dear  ___] : Dear  [unfilled], [Consult Letter:] : I had the pleasure of evaluating your patient, [unfilled]. [( Thank you for referring [unfilled] for consultation for _____ )] : Thank you for referring [unfilled] for consultation for [unfilled] [Please see my note below.] : Please see my note below. [Consult Closing:] : Thank you very much for allowing me to participate in the care of this patient.  If you have any questions, please do not hesitate to contact me. [Sincerely,] : Sincerely, [FreeTextEntry2] : Shirley oMnk MD [FreeTextEntry3] : Awilda Garnica MD\par [facility] [place] [facility]\par [address]\par [place] [id number]\par Tel [phone]\par

## 2019-03-11 NOTE — PHYSICAL EXAM
[de-identified] : Patient is well appearing, in non acute distress with nonlabored breathing and appropriate mood and affect. Extra-ocular movements intact and no nasal discharge.\par \par Right UE warm and well perfused; palpable radial pulse\par SILT C5-T1\par motor intact to finger flexion, wrist extension and flexion, elbow flexion and extension, supination and pronation, deltoid\par \par Left UE warm and well perfused; palpable radial pulse\par SILT C5-T1\par motor intact to finger flexion, wrist extension and flexion, elbow flexion and extension, supination and pronation, deltoid\par \par \par Right Shoulder \par \par Appearance\par negative atrophy of musculature\par negative winging\par \par Tenderness to Palpation about the shoulder is negative\par \par Range of Motion: Active / Passive\par Forward Elevation: full \par Abduction: full \par ER at 0 degrees of abduction: 30/35\par ER at 90 degrees of abduction: 75/80\par Internal Rotation T10\par \par Motor Strength\par Supraspinatus: 5 /5\par Infraspinatus: 5 /5\par Subscapularis: 5 /5\par Teres Minor: 5/ 5\par \par Provocative Maneuvers\par negative Drop Arm\par negative Belly Press\par negative Hornblowers\par negative Horvath\par negative Neer\par negative Speeds\par \par Left Shoulder \par \par Appearance\par negative atrophy of musculature\par negative winging\par \par Tenderness to Palpation about the shoulder is negative\par \par Range of Motion: Active / Passive\par Forward Elevation: full \par Abduction: full \par ER at 0 degrees of abduction: 15/20\par ER at 90 degrees of abduction: 55/65\par Internal Rotation T. 10\par \par Motor Strength\par Supraspinatus: 5 /5\par Infraspinatus: 3/5  without external lag sign\par Subscapularis: 5/5\par Teres Minor: 3/ 5  without Hornblower sign\par \par Provocative Maneuvers\par negative Drop Arm\par negative Belly Press\par negative Hornblowers\par negative Horvath\par negative Neer\par negative Speeds [de-identified] : Left Shoulder Xray was visualized and reviewed by me\par well preserved glenohumeral joint space and degenerative acromioclavicular joint space\par no fracture or dislocation\par

## 2019-03-11 NOTE — HISTORY OF PRESENT ILLNESS
[de-identified] : Mr. RUSS is a 58 year old male who presents with Left shoulder pain. Patient states he was giving an injection by Dr. Ferraro on May, 2018. Patient with shoulder pain since last year and received subacromial injection.  Was doing well, on/off, until fell onto it 5 weeks ago.  Has not tried oral nsaids.  Used lidocaine cream without relief.  Is able to take nsaids but hasn’t.  No PT.  Of note, patient drinks 4-5 drinks per day, after work.  Consists of vodka or beer.  Notes he had elevated LFTs but states recent blood work was fine.\par \par Hand Dominance: RHD\par Location: Left shoulder \par Timing: 15 months \par Quality: burning \par Severity: 5/10\par Treatments Tried: lidocaine patch\par Associated Signs and Symptoms: burning \par What makes it worse:lifting, movement\par What makes it better: rest\par Improving/Worsening/Unchanged: worsening \par PT hx: no PT\par

## 2019-03-22 ENCOUNTER — OUTPATIENT (OUTPATIENT)
Dept: OUTPATIENT SERVICES | Facility: HOSPITAL | Age: 59
LOS: 1 days | End: 2019-03-22
Payer: COMMERCIAL

## 2019-03-22 ENCOUNTER — APPOINTMENT (OUTPATIENT)
Dept: MRI IMAGING | Facility: CLINIC | Age: 59
End: 2019-03-22
Payer: COMMERCIAL

## 2019-03-22 DIAGNOSIS — Z00.8 ENCOUNTER FOR OTHER GENERAL EXAMINATION: ICD-10-CM

## 2019-03-22 DIAGNOSIS — M75.122 COMPLETE ROTATOR CUFF TEAR OR RUPTURE OF LEFT SHOULDER, NOT SPECIFIED AS TRAUMATIC: ICD-10-CM

## 2019-03-22 PROCEDURE — 73221 MRI JOINT UPR EXTREM W/O DYE: CPT

## 2019-03-22 PROCEDURE — 73221 MRI JOINT UPR EXTREM W/O DYE: CPT | Mod: 26,LT

## 2019-04-01 ENCOUNTER — APPOINTMENT (OUTPATIENT)
Age: 59
End: 2019-04-01
Payer: COMMERCIAL

## 2019-04-01 VITALS
HEIGHT: 67 IN | DIASTOLIC BLOOD PRESSURE: 96 MMHG | WEIGHT: 206 LBS | HEART RATE: 81 BPM | SYSTOLIC BLOOD PRESSURE: 160 MMHG | BODY MASS INDEX: 32.33 KG/M2

## 2019-04-01 PROCEDURE — 99214 OFFICE O/P EST MOD 30 MIN: CPT | Mod: 25

## 2019-04-01 PROCEDURE — 20610 DRAIN/INJ JOINT/BURSA W/O US: CPT | Mod: LT

## 2019-05-01 NOTE — PROCEDURE
[de-identified] : Injection: Left shoulder (Subacromial).\par Indication: Left massive rotator cuff tear.\par \par A discussion was had with the patient regarding this procedure and all questions were answered. All risks, benefits and alternatives were discussed. These included but were not limited to bleeding, infection, and allergic reaction. Alcohol was used to clean the skin, and betadine was used to sterilize and prep the area in the posterior aspect of the left shoulder. Ethyl chloride spray was then used as a topical anesthetic. A 21-gauge needle was used to inject 4cc of 1% lidocaine and 4 cc 0.25% marcaine and 1cc of 40mg/ml kenalog into the left subacromial space. A sterile bandage was then applied. The patient tolerated the procedure well and there were no complications.

## 2019-05-01 NOTE — ASSESSMENT
[FreeTextEntry1] : Patient is a 58-year-old male who presents with signs and symptoms of left complete rotator cuff tear.Discussed findings and diagnosis and the risks, benefits, and alternatives of all treatment options. Patient wished to proceed with a left subacromial steroid injection. Patient tolerated injection well. Patient deferred PT at this time. Will follow up prn.

## 2019-05-01 NOTE — HISTORY OF PRESENT ILLNESS
[de-identified] : Mr. RUSS is a 58 year old male who is being seen for Follow-up of Left shoulder pain. Took meloxicam daily for 2 weeks without relief.  No injection. Had MRI.  Here for follow up.  No diabetes.  \par

## 2019-05-01 NOTE — REVIEW OF SYSTEMS
[Joint Pain] : joint pain [Discharge] : no discharge [Chills] : no chills [Cough] : no cough [FreeTextEntry9] : Left shoulder

## 2019-05-01 NOTE — PHYSICAL EXAM
[de-identified] : \par \par \par EXAM: MR SHOULDER LT \par \par \par PROCEDURE DATE: 03/22/2019 \par \par \par \par INTERPRETATION: \par LEFT SHOULDER MRI \par \par CLINICAL INFORMATION: Left shoulder pain. Evaluate for rotator cuff tear. \par TECHNIQUE: Multiplanar, multisequence MR imaging was obtained of the left \par shoulder. \par FINDINGS: \par \par ROTATOR CUFF: There are full-thickness, full width retracted tears of the \par supraspinatus and infraspinatus with retraction to the level of the \par glenohumeral joint. There is severe subscapularis tendinosis with high-grade \par interstitial partial tearing. The teres minor is intact. \par BICEPS TENDON: The biceps is perched medially on the lesser tuberosity with \par severe tendinosis of the intra-articular portion of the tendon. There is \par intrasubstance tearing near its attachment of the labrum. \par AC JOINT: Moderate AC joint arthrosis is present. \par GLENOID LABRUM AND GLENOHUMERAL LIGAMENTS: There is diffuse nondisplaced \par degenerative tearing of the superior labrum. \par GLENOHUMERAL CARTILAGE AND SUBCHONDRAL BONE: Partial-thickness chondral \par fissuring seen at the anterior aspect of the glenoid. There is small \par glenohumeral osteophyte formation. The humeral head is mildly high riding \par SYNOVIUM/JOINT FLUID: There is a moderate-sized joint effusion with \par extension into the subacromial subdeltoid bursa. Markedly prominent fluid is \par seen within the subcoracoid recess. \par BONE MARROW: Mild subchondral cystic change is present on both sides of the \par AC joint. \par MUSCLES: There is mild atrophy of the supraspinatus with mild to moderate \par atrophy of the subscapularis and infraspinatus. \par NEUROVASCULAR STRUCTURES: Normal \par SUBCUTANEOUS SOFT TISSUES: Normal \par \par IMPRESSION: \par \par Full-thickness, full width retracted tears of the supraspinatus and \par infraspinatus with severe tendinosis and interstitial tearing of the \par subscapularis. Mild atrophy of the supraspinatus with mild to moderate \par atrophy of the infraspinatus and subscapularis. \par \par Perched appearance of the biceps and the lesser tuberosity with severe \par tendinosis and interstitial tearing near its attachment. Associated \par degenerative tearing of the superior labrum. \par \par Moderate AC joint arthrosis. \par \par \par \par \par \par \par \par \par DAVIAN NAGY M.D., ATTENDING RADIOLOGIST Phone #: [phone] \par This document has been electronically signed. Mar 24 2019 9:36AM \par \par  [de-identified] : Patient is well appearing, in non acute distress with nonlabored breathing and appropriate mood and affect. Extra-ocular movements intact and no nasal discharge.\par \par Right UE warm and well perfused; palpable radial pulse\par SILT C5-T1\par motor intact to finger flexion and extension, wrist flexion and extension, elbow flexion and extension, supination and pronation, deltoid\par \par Left UE warm and well perfused; palpable radial pulse\par SILT C5-T1\par motor intact to finger flexion and extension, wrist flexion and extension, elbow flexion and extension, supination and pronation, deltoid\par \par Right Shoulder \par \par Appearance\par negative atrophy of musculature\par negative winging\par \par Tenderness to Palpation about the shoulder is negative\par \par Range of Motion: Active / Passive\par Forward Elevation: full \par Abduction: full \par ER at 0 degrees of abduction: full \par ER at 90 degrees of abduction: full \par Internal Rotation full\par \par Motor Strength\par Supraspinatus: 5 /5\par Infraspinatus: 5 /5\par Subscapularis: 5 /5\par Teres Minor: 5/ 5\par \par Provocative Maneuvers\par negative Drop Arm\par negative Belly Press\par negative Hornblowers\par negative Horvath\par negative Neer\par negative Speeds\par \par Left Shoulder \par \par Appearance\par negative atrophy of musculature\par negative winging\par \par Tenderness to Palpation about the shoulder is negative\par \par Range of Motion: Active / Passive\par Forward Elevation: full \par Abduction: full \par ER at 0 degrees of abduction: -5 15\par ER at 90 degrees of abduction: full \par Internal Rotation full\par \par Motor Strength\par Supraspinatus: 5 /5\par Infraspinatus: 3/5\par Subscapularis: 5 /5\par Teres Minor: 3 with pain/ 5\par \par Provocative Maneuvers\par negative Drop Arm\par negative Belly Press\par negative Hornblowers\par negative Horvath\par negative Neer\par negative Speeds

## 2019-05-06 ENCOUNTER — RX RENEWAL (OUTPATIENT)
Age: 59
End: 2019-05-06

## 2019-05-10 ENCOUNTER — APPOINTMENT (OUTPATIENT)
Dept: FAMILY MEDICINE | Facility: CLINIC | Age: 59
End: 2019-05-10
Payer: COMMERCIAL

## 2019-05-10 VITALS
HEIGHT: 67 IN | BODY MASS INDEX: 32.33 KG/M2 | DIASTOLIC BLOOD PRESSURE: 94 MMHG | SYSTOLIC BLOOD PRESSURE: 148 MMHG | HEART RATE: 88 BPM | WEIGHT: 206 LBS

## 2019-05-10 DIAGNOSIS — M25.512 PAIN IN LEFT SHOULDER: ICD-10-CM

## 2019-05-10 DIAGNOSIS — E66.9 OBESITY, UNSPECIFIED: ICD-10-CM

## 2019-05-10 DIAGNOSIS — F41.9 ANXIETY DISORDER, UNSPECIFIED: ICD-10-CM

## 2019-05-10 PROCEDURE — 99214 OFFICE O/P EST MOD 30 MIN: CPT

## 2019-05-10 RX ORDER — MELOXICAM 15 MG/1
15 TABLET ORAL
Qty: 30 | Refills: 0 | Status: ACTIVE | COMMUNITY
Start: 2019-03-11 | End: 1900-01-01

## 2019-05-10 NOTE — HEALTH RISK ASSESSMENT
[Change in mental status noted] : No change in mental status noted [None] : None [Language] : denies difficulty with language [Handling Complex Tasks] : difficulty handling complex tasks [With Family] : lives with family [Employed] : employed [Feels Safe at Home] : Feels safe at home [Fully functional (using the telephone, shopping, preparing meals, housekeeping, doing laundry, using] : Fully functional and needs no help or supervision to perform IADLs (using the telephone, shopping, preparing meals, housekeeping, doing laundry, using transportation, managing medications and managing finances) [Fully functional (bathing, dressing, toileting, transferring, walking, feeding)] : Fully functional (bathing, dressing, toileting, transferring, walking, feeding) [Reports changes in hearing] : Reports no changes in hearing [ColonoscopyComments] : never [Reports changes in dental health] : Reports no changes in dental health

## 2019-05-10 NOTE — COUNSELING
[Activity counseling provided] : activity [Healthy eating counseling provided] : healthy eating [Weight management counseling provided] : Weight management [Behavioral health counseling provided] : behavioral health

## 2019-05-10 NOTE — ASSESSMENT
[FreeTextEntry1] :   HTN-  uncontrolled - continue amlodipine to 10 mg /increase carvediol to 25 mg BID\par and continue rest/ advise lab and follow up in3 months\par compliance discussed/decrease salty food/ increase amlodipine to 10 mg and add carvedilol to existing meds/ follow up in 2 weeks\par Left shoulder pain/ bursitis- advise meloxicam and PT / Ice \par anxiety- renew meds under control\par follow up 2 months \par screen for colon ca- FIT/ [facility] referral- advsie to send it in- advised to make appts.\par screen for prostates ca- PSAral\par alcohol drinker- drinks 4-5 /day advise to cut down no more then 2/day/discussed effects of alcohol on liver sathish with high ferritin and elevated lft- reiterated again- will draw lab.\par elevated ferritin- Hematology consult\par prediabetes/ hyperlipedemia- advise die\par elevated LFT- US and refrain from alcohol\par Diet and Activity: - Choose lean meats and poultry without skin and prepare them without added saturated and trans \par fat. Eat fish at least,twice a week. Recent research shows that eating oily \par fish containing omega-3,fatty acids (for example, salmon, trout and herring) \par may help lower your risk of death from coronary artery disease. Select \par fat-free, 1 percent fat and low-fat dairy products. Cut back on foods \par containing partially hydrogenated vegetable oils to reduce trans fat in your \par diet. To lower cholesterol, reduce saturated fat to no more than 5 to 6 \par percent of total calories. For someone eating 2,000 calories a day, that’s \par about 13 grams of saturated fat. Cut back on beverages and foods with added \par sugars. Choose and prepare foods with little or no salt. To lower blood \par pressure, aim to eat no more than 2,400 milligrams of sodium per day. \par Reducing daily intake to 1,500 mg is desirable because it can lower blood \par pressure even further. If you drink alcohol, drink in moderation. That means \par one drink per day if youre a woman and two drinks per day if youre a \par man Follow the American Heart Association recommendations when you eat out, \par and keep an eye on your portion sizes.\par follow up in 3 months /prn

## 2019-05-10 NOTE — PHYSICAL EXAM
[Well Nourished] : well nourished [No Acute Distress] : no acute distress [Well-Appearing] : well-appearing [Well Developed] : well developed [Normal Sclera/Conjunctiva] : normal sclera/conjunctiva [PERRL] : pupils equal round and reactive to light [EOMI] : extraocular movements intact [Normal Outer Ear/Nose] : the outer ears and nose were normal in appearance [No JVD] : no jugular venous distention [Normal Oropharynx] : the oropharynx was normal [Thyroid Normal, No Nodules] : the thyroid was normal and there were no nodules present [Supple] : supple [No Lymphadenopathy] : no lymphadenopathy [No Respiratory Distress] : no respiratory distress  [Clear to Auscultation] : lungs were clear to auscultation bilaterally [Regular Rhythm] : with a regular rhythm [Normal Rate] : normal rate  [No Accessory Muscle Use] : no accessory muscle use [No Carotid Bruits] : no carotid bruits [Normal S1, S2] : normal S1 and S2 [No Murmur] : no murmur heard [No Varicosities] : no varicosities [No Abdominal Bruit] : a ~M bruit was not heard ~T in the abdomen [No Edema] : there was no peripheral edema [No Extremity Clubbing/Cyanosis] : no extremity clubbing/cyanosis [Pedal Pulses Present] : the pedal pulses are present [Soft] : abdomen soft [No Palpable Aorta] : no palpable aorta [Non-distended] : non-distended [Non Tender] : non-tender [No Masses] : no abdominal mass palpated [No HSM] : no HSM [Normal Anterior Cervical Nodes] : no anterior cervical lymphadenopathy [Normal Bowel Sounds] : normal bowel sounds [Normal Posterior Cervical Nodes] : no posterior cervical lymphadenopathy [No Spinal Tenderness] : no spinal tenderness [No CVA Tenderness] : no CVA  tenderness [No Rash] : no rash [No Joint Swelling] : no joint swelling [Grossly Normal Strength/Tone] : grossly normal strength/tone [Coordination Grossly Intact] : coordination grossly intact [Normal Gait] : normal gait [No Focal Deficits] : no focal deficits [Normal Affect] : the affect was normal [Deep Tendon Reflexes (DTR)] : deep tendon reflexes were 2+ and symmetric [Normal Insight/Judgement] : insight and judgment were intact [de-identified] : FROM/ neck muscle spasm

## 2019-05-10 NOTE — HISTORY OF PRESENT ILLNESS
[FreeTextEntry1] : HTN/Left shoulder pain/anxiety [de-identified] : no complaints\par has h/o high LFt in the past\par drinks 4-5 drinks/day\par h/o high Bilirubin as well\par 07/2018- here for BP follow up\par declines any chest pain/headache or SOB\par did not take meds yet.\par 08/2018- here for BP follow up /has cut down on salt and doing well but continues to drink 6 beers/day may be more on the weekened. feels its not much and he is doing Okay.\par has not seen cardio/gastro and Not done FIT testing\par 10/2018- no changes\par 01/2019- feels okay/ cutting down alcohol\par saw gastro\par \par 05/2019- here for HTN check states ran out of amlodipine\par still drinks alcohol\par left shoulder pain not doing too well even after shots/ does landscaping work and constantly using arm\par

## 2019-06-26 ENCOUNTER — APPOINTMENT (OUTPATIENT)
Dept: ORTHOPEDIC SURGERY | Facility: CLINIC | Age: 59
End: 2019-06-26
Payer: COMMERCIAL

## 2019-06-26 VITALS
HEART RATE: 68 BPM | SYSTOLIC BLOOD PRESSURE: 137 MMHG | BODY MASS INDEX: 32.33 KG/M2 | DIASTOLIC BLOOD PRESSURE: 89 MMHG | WEIGHT: 206 LBS | HEIGHT: 67 IN

## 2019-06-26 DIAGNOSIS — M75.122 COMPLETE ROTATOR CUFF TEAR OR RUPTURE OF LEFT SHOULDER, NOT SPECIFIED AS TRAUMATIC: ICD-10-CM

## 2019-06-26 PROCEDURE — 20610 DRAIN/INJ JOINT/BURSA W/O US: CPT | Mod: LT

## 2019-06-26 PROCEDURE — 99213 OFFICE O/P EST LOW 20 MIN: CPT | Mod: 25

## 2019-08-19 ENCOUNTER — RX RENEWAL (OUTPATIENT)
Age: 59
End: 2019-08-19

## 2019-08-28 ENCOUNTER — RX RENEWAL (OUTPATIENT)
Age: 59
End: 2019-08-28

## 2019-09-04 ENCOUNTER — APPOINTMENT (OUTPATIENT)
Dept: FAMILY MEDICINE | Facility: CLINIC | Age: 59
End: 2019-09-04
Payer: COMMERCIAL

## 2019-09-04 VITALS
HEART RATE: 76 BPM | DIASTOLIC BLOOD PRESSURE: 84 MMHG | HEIGHT: 67 IN | SYSTOLIC BLOOD PRESSURE: 140 MMHG | BODY MASS INDEX: 32.49 KG/M2 | WEIGHT: 207 LBS

## 2019-09-04 DIAGNOSIS — I10 ESSENTIAL (PRIMARY) HYPERTENSION: ICD-10-CM

## 2019-09-04 DIAGNOSIS — Z00.00 ENCOUNTER FOR GENERAL ADULT MEDICAL EXAMINATION W/OUT ABNORMAL FINDINGS: ICD-10-CM

## 2019-09-04 PROCEDURE — 99396 PREV VISIT EST AGE 40-64: CPT | Mod: 25

## 2019-09-04 PROCEDURE — 99214 OFFICE O/P EST MOD 30 MIN: CPT | Mod: 25

## 2019-09-04 NOTE — PHYSICAL EXAM
[No Acute Distress] : no acute distress [Well Developed] : well developed [Well Nourished] : well nourished [Well-Appearing] : well-appearing [Normal Sclera/Conjunctiva] : normal sclera/conjunctiva [EOMI] : extraocular movements intact [PERRL] : pupils equal round and reactive to light [Normal Outer Ear/Nose] : the outer ears and nose were normal in appearance [Normal Oropharynx] : the oropharynx was normal [No JVD] : no jugular venous distention [Supple] : supple [No Lymphadenopathy] : no lymphadenopathy [Thyroid Normal, No Nodules] : the thyroid was normal and there were no nodules present [No Respiratory Distress] : no respiratory distress  [Clear to Auscultation] : lungs were clear to auscultation bilaterally [No Accessory Muscle Use] : no accessory muscle use [Normal Rate] : normal rate  [Regular Rhythm] : with a regular rhythm [Normal S1, S2] : normal S1 and S2 [No Murmur] : no murmur heard [No Carotid Bruits] : no carotid bruits [No Abdominal Bruit] : a ~M bruit was not heard ~T in the abdomen [No Varicosities] : no varicosities [Pedal Pulses Present] : the pedal pulses are present [No Edema] : there was no peripheral edema [No Extremity Clubbing/Cyanosis] : no extremity clubbing/cyanosis [No Palpable Aorta] : no palpable aorta [Soft] : abdomen soft [Non Tender] : non-tender [Non-distended] : non-distended [No Masses] : no abdominal mass palpated [No HSM] : no HSM [Normal Bowel Sounds] : normal bowel sounds [Normal Posterior Cervical Nodes] : no posterior cervical lymphadenopathy [Normal Anterior Cervical Nodes] : no anterior cervical lymphadenopathy [No CVA Tenderness] : no CVA  tenderness [No Spinal Tenderness] : no spinal tenderness [No Joint Swelling] : no joint swelling [Grossly Normal Strength/Tone] : grossly normal strength/tone [No Rash] : no rash [Normal Gait] : normal gait [Coordination Grossly Intact] : coordination grossly intact [No Focal Deficits] : no focal deficits [Deep Tendon Reflexes (DTR)] : deep tendon reflexes were 2+ and symmetric [Normal Affect] : the affect was normal [Normal Insight/Judgement] : insight and judgment were intact [de-identified] : FROM/ neck muscle spasm

## 2019-09-04 NOTE — HISTORY OF PRESENT ILLNESS
[de-identified] : no complaints\par has h/o high LFt in the past\par drinks 4-5 drinks/day\par h/o high Bilirubin as well\par 07/2018- here for BP follow up\par declines any chest pain/headache or SOB\par did not take meds yet.\par 08/2018- here for BP follow up /has cut down on salt and doing well but continues to drink 6 beers/day may be more on the weekened. 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

## 2019-09-04 NOTE — ASSESSMENT
[FreeTextEntry1] : HM- lab will do at lab\par declined EKG states doesnot have time\par  HTN-  uncontrolled - continue amlodipine to 10 mg /increase carvediol to 25 mg BID\par and continue rest/ advise lab and follow up in3 months\par compliance discussed/decrease salty food/ increase amlodipine to 10 mg and add carvedilol to existing meds/ follow up in 2 weeks\par alcoholism-a dvise cutting down.\par Left shoulder pain/ bursitis- advise meloxicam and PT / Ice \par anxiety- renew meds under control\par follow up 2 months \par screen for colon ca- FIT/ [facility] referral- advsie to send it in- advised to make appts.\par screen for prostates ca- PSAral\par alcohol drinker- drinks 4-5 /day advise to cut down no more then 2/day/discussed effects of alcohol on liver sathish with high ferritin and elevated lft- reiterated again- will draw lab.\par elevated ferritin- Hematology consult\par prediabetes/ hyperlipedemia- advise die\par elevated LFT- US and refrain from alcohol\par Diet and Activity: - Choose lean meats and poultry without skin and prepare them without added saturated and trans \par fat. Eat fish at least,twice a week. Recent research shows that eating oily \par fish containing omega-3,fatty acids (for example, salmon, trout and herring) \par may help lower your risk of death from coronary artery disease. Select \par fat-free, 1 percent fat and low-fat dairy products. Cut back on foods \par containing partially hydrogenated vegetable oils to reduce trans fat in your \par diet. To lower cholesterol, reduce saturated fat to no more than 5 to 6 \par percent of total calories. For someone eating 2,000 calories a day, that’s \par about 13 grams of saturated fat. Cut back on beverages and foods with added \par sugars. Choose and prepare foods with little or no salt. To lower blood \par pressure, aim to eat no more than 2,400 milligrams of sodium per day. \par Reducing daily intake to 1,500 mg is desirable because it can lower blood \par pressure even further. If you drink alcohol, drink in moderation. That means \par one drink per day if youre a woman and two drinks per day if youre a \par man Follow the American Heart Association recommendations when you eat out, \par and keep an eye on your portion sizes.\par follow up in 3 months /prn

## 2019-09-04 NOTE — COUNSELING
[Support options provided] : Support options provided [Quit Drinking] : Quit Drinking [Participate in Treatment Program] : Participate in treatment program [Weight management counseling provided] : Weight management [Healthy eating counseling provided] : healthy eating [Activity counseling provided] : activity [Behavioral health counseling provided] : behavioral health

## 2019-10-17 ENCOUNTER — RX RENEWAL (OUTPATIENT)
Age: 59
End: 2019-10-17

## 2019-12-02 LAB
25(OH)D3 SERPL-MCNC: 23.3 NG/ML
ALBUMIN SERPL ELPH-MCNC: 4.6 G/DL
ALP BLD-CCNC: 73 U/L
ALT SERPL-CCNC: 34 U/L
ANION GAP SERPL CALC-SCNC: 13 MMOL/L
APPEARANCE: CLEAR
AST SERPL-CCNC: 20 U/L
BASOPHILS # BLD AUTO: 0.03 K/UL
BASOPHILS NFR BLD AUTO: 0.4 %
BILIRUB SERPL-MCNC: 1.2 MG/DL
BILIRUBIN URINE: NEGATIVE
BLOOD URINE: NEGATIVE
BUN SERPL-MCNC: 21 MG/DL
CALCIUM SERPL-MCNC: 9.6 MG/DL
CHLORIDE SERPL-SCNC: 98 MMOL/L
CHOLEST SERPL-MCNC: 232 MG/DL
CHOLEST/HDLC SERPL: 3.3 RATIO
CO2 SERPL-SCNC: 29 MMOL/L
COLOR: NORMAL
CREAT SERPL-MCNC: 1.23 MG/DL
EOSINOPHIL # BLD AUTO: 0.15 K/UL
EOSINOPHIL NFR BLD AUTO: 2.1 %
ESTIMATED AVERAGE GLUCOSE: 114 MG/DL
FERRITIN SERPL-MCNC: 356 NG/ML
FOLATE SERPL-MCNC: 8.3 NG/ML
GLUCOSE QUALITATIVE U: NEGATIVE
GLUCOSE SERPL-MCNC: 110 MG/DL
HBA1C MFR BLD HPLC: 5.6 %
HCT VFR BLD CALC: 43.8 %
HCV AB SER QL: NONREACTIVE
HCV S/CO RATIO: 0.43 S/CO
HDLC SERPL-MCNC: 70 MG/DL
HEMOCCULT STL QL IA: NEGATIVE
HGB BLD-MCNC: 14.8 G/DL
IMM GRANULOCYTES NFR BLD AUTO: 0.8 %
IRON SATN MFR SERPL: 33 %
IRON SERPL-MCNC: 107 UG/DL
KETONES URINE: NEGATIVE
LDLC SERPL CALC-MCNC: 139 MG/DL
LEUKOCYTE ESTERASE URINE: NEGATIVE
LYMPHOCYTES # BLD AUTO: 2.07 K/UL
LYMPHOCYTES NFR BLD AUTO: 28.5 %
MAN DIFF?: NORMAL
MCHC RBC-ENTMCNC: 32.5 PG
MCHC RBC-ENTMCNC: 33.8 GM/DL
MCV RBC AUTO: 96.1 FL
MONOCYTES # BLD AUTO: 0.87 K/UL
MONOCYTES NFR BLD AUTO: 12 %
NEUTROPHILS # BLD AUTO: 4.08 K/UL
NEUTROPHILS NFR BLD AUTO: 56.2 %
NITRITE URINE: NEGATIVE
PH URINE: 6
PLATELET # BLD AUTO: 232 K/UL
POTASSIUM SERPL-SCNC: 4.5 MMOL/L
PROT SERPL-MCNC: 6.8 G/DL
PROTEIN URINE: NEGATIVE
PSA FREE FLD-MCNC: 20 %
PSA FREE SERPL-MCNC: 0.32 NG/ML
PSA SERPL-MCNC: 1.61 NG/ML
RBC # BLD: 4.56 M/UL
RBC # FLD: 13.3 %
SODIUM SERPL-SCNC: 140 MMOL/L
SPECIFIC GRAVITY URINE: 1.02
T4 FREE SERPL-MCNC: 1 NG/DL
TIBC SERPL-MCNC: 324 UG/DL
TRIGL SERPL-MCNC: 113 MG/DL
TSH SERPL-ACNC: 1.88 UIU/ML
UIBC SERPL-MCNC: 217 UG/DL
UROBILINOGEN URINE: NORMAL
VIT B12 SERPL-MCNC: 486 PG/ML
WBC # FLD AUTO: 7.26 K/UL

## 2019-12-04 ENCOUNTER — APPOINTMENT (OUTPATIENT)
Dept: FAMILY MEDICINE | Facility: CLINIC | Age: 59
End: 2019-12-04
Payer: COMMERCIAL

## 2019-12-04 VITALS
HEIGHT: 67 IN | DIASTOLIC BLOOD PRESSURE: 90 MMHG | SYSTOLIC BLOOD PRESSURE: 140 MMHG | WEIGHT: 210 LBS | BODY MASS INDEX: 32.96 KG/M2 | HEART RATE: 74 BPM

## 2019-12-04 DIAGNOSIS — I10 ESSENTIAL (PRIMARY) HYPERTENSION: ICD-10-CM

## 2019-12-04 DIAGNOSIS — R94.5 ABNORMAL RESULTS OF LIVER FUNCTION STUDIES: ICD-10-CM

## 2019-12-04 DIAGNOSIS — E78.5 HYPERLIPIDEMIA, UNSPECIFIED: ICD-10-CM

## 2019-12-04 PROCEDURE — 99214 OFFICE O/P EST MOD 30 MIN: CPT

## 2019-12-04 NOTE — HISTORY OF PRESENT ILLNESS
[de-identified] : no complaints\par has h/o high LFt in the past\par drinks 4-5 drinks/day\par h/o high Bilirubin as well\par 07/2018- here for BP follow up\par declines any chest pain/headache or SOB\par did not take meds yet.\par 08/2018- here for BP follow up /has cut down on salt and doing well but continues to drink 6 beers/day may be more on the weekened. 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

## 2019-12-04 NOTE — HEALTH RISK ASSESSMENT
[Handling Complex Tasks] : difficulty handling complex tasks [None] : None [With Family] : lives with family [Employed] : employed [Feels Safe at Home] : Feels safe at home [Fully functional (bathing, dressing, toileting, transferring, walking, feeding)] : Fully functional (bathing, dressing, toileting, transferring, walking, feeding) [Fully functional (using the telephone, shopping, preparing meals, housekeeping, doing laundry, using] : Fully functional and needs no help or supervision to perform IADLs (using the telephone, shopping, preparing meals, housekeeping, doing laundry, using transportation, managing medications and managing finances) [Change in mental status noted] : No change in mental status noted [Reports changes in dental health] : Reports no changes in dental health [Language] : denies difficulty with language [Reports changes in hearing] : Reports no changes in hearing [ColonoscopyComments] : never

## 2019-12-04 NOTE — PHYSICAL EXAM

## 2019-12-04 NOTE — ASSESSMENT
[FreeTextEntry1] : HTN uncontrolled- Wants to wait on increase meds, will wattch salt and diet and if not controlled will add additional meds/ understands risk\par Cardio referral\par Need new colonoscopy- will wait\par follow up 6 weeks for BP check\par \par HM- lab will do at lab\par declined EKG states doesnot have time\par  HTN-  uncontrolled - continue amlodipine to 10 mg /increase carvedilol to 25 mg BID\par and continue rest/ advise lab and follow up in3 months\par compliance discussed/decrease salty food/ increase amlodipine to 10 mg and add carvedilol to existing meds/ follow up in 2 weeks\par alcoholism-a dvise cutting down.\par Left shoulder pain/ bursitis- advise meloxicam and PT / Ice \par anxiety- renew meds under control\par follow up 2 months \par screen for colon ca- FIT/ GE referral- advsie to send it in- advised to make appts.\par screen for prostates ca- PSAral\par alcohol drinker- drinks 4-5 /day advise to cut down no more then 2/day/discussed effects of alcohol on liver sathish with high ferritin and elevated lft- reiterated again- will draw lab.\par elevated ferritin- Hematology consult\par prediabetes/ hyperlipedemia- advise die\par elevated LFT- US and refrain from alcohol\par Diet and Activity: - Choose lean meats and poultry without skin and prepare them without added saturated and trans \par fat. Eat fish at least,twice a week. Recent research shows that eating oily \par fish containing omega-3,fatty acids (for example, salmon, trout and herring) \par may help lower your risk of death from coronary artery disease. Select \par fat-free, 1 percent fat and low-fat dairy products. Cut back on foods \par containing partially hydrogenated vegetable oils to reduce trans fat in your \par diet. To lower cholesterol, reduce saturated fat to no more than 5 to 6 \par percent of total calories. For someone eating 2,000 calories a day, that’s \par about 13 grams of saturated fat. Cut back on beverages and foods with added \par sugars. Choose and prepare foods with little or no salt. To lower blood \par pressure, aim to eat no more than 2,400 milligrams of sodium per day. \par Reducing daily intake to 1,500 mg is desirable because it can lower blood \par pressure even further. If you drink alcohol, drink in moderation. That means \par one drink per day if youre a woman and two drinks per day if youre a \par man Follow the American Heart Association recommendations when you eat out, \par and keep an eye on your portion sizes.\par follow up in 3 months /prn

## 2019-12-04 NOTE — COUNSELING
[Support options provided] : Support options provided [Quit Drinking] : Quit Drinking [Participate in Treatment Program] : Participate in treatment program [Weight management counseling provided] : Weight management [Activity counseling provided] : activity [Healthy eating counseling provided] : healthy eating [Behavioral health counseling provided] : behavioral health

## 2019-12-31 ENCOUNTER — APPOINTMENT (OUTPATIENT)
Dept: DERMATOLOGY | Facility: CLINIC | Age: 59
End: 2019-12-31
Payer: COMMERCIAL

## 2019-12-31 PROCEDURE — 99202 OFFICE O/P NEW SF 15 MIN: CPT

## 2020-01-15 ENCOUNTER — APPOINTMENT (OUTPATIENT)
Dept: FAMILY MEDICINE | Facility: CLINIC | Age: 60
End: 2020-01-15

## 2020-03-16 ENCOUNTER — RX RENEWAL (OUTPATIENT)
Age: 60
End: 2020-03-16

## 2020-03-19 NOTE — HISTORY OF PRESENT ILLNESS
[FreeTextEntry1] : here for BP follow up [de-identified] : no complaints\par has h/o high LFt in the past\par drinks 4-5 drinks/day\par h/o high Bilirubin as well\par 07/2018- here for BP follow up\par declines any chest pain/headache or SOB\par did not take meds yet. 19-Mar-2020 11:57

## 2020-06-23 ENCOUNTER — RX RENEWAL (OUTPATIENT)
Age: 60
End: 2020-06-23

## 2020-06-23 RX ORDER — CARVEDILOL 25 MG/1
25 TABLET, FILM COATED ORAL TWICE DAILY
Qty: 60 | Refills: 0 | Status: ACTIVE | COMMUNITY
Start: 2018-07-10 | End: 1900-01-01

## 2020-06-23 RX ORDER — HYDROCHLOROTHIAZIDE 25 MG/1
25 TABLET ORAL DAILY
Qty: 30 | Refills: 0 | Status: ACTIVE | COMMUNITY
Start: 2019-10-17 | End: 1900-01-01

## 2020-06-23 RX ORDER — AMLODIPINE BESYLATE 10 MG/1
10 TABLET ORAL
Qty: 30 | Refills: 0 | Status: ACTIVE | COMMUNITY
Start: 2018-02-20 | End: 1900-01-01

## 2020-07-19 ENCOUNTER — INPATIENT (INPATIENT)
Facility: HOSPITAL | Age: 60
LOS: 1 days | Discharge: HOME | DRG: 602 | End: 2020-07-21
Attending: FAMILY MEDICINE | Admitting: HOSPITALIST
Payer: COMMERCIAL

## 2020-07-19 VITALS
HEIGHT: 67 IN | HEART RATE: 102 BPM | WEIGHT: 199.96 LBS | SYSTOLIC BLOOD PRESSURE: 71 MMHG | OXYGEN SATURATION: 99 % | DIASTOLIC BLOOD PRESSURE: 55 MMHG | RESPIRATION RATE: 20 BRPM

## 2020-07-19 DIAGNOSIS — N17.9 ACUTE KIDNEY FAILURE, UNSPECIFIED: ICD-10-CM

## 2020-07-19 DIAGNOSIS — I10 ESSENTIAL (PRIMARY) HYPERTENSION: ICD-10-CM

## 2020-07-19 DIAGNOSIS — L03.113 CELLULITIS OF RIGHT UPPER LIMB: ICD-10-CM

## 2020-07-19 DIAGNOSIS — A41.9 SEPSIS, UNSPECIFIED ORGANISM: ICD-10-CM

## 2020-07-19 LAB
ALBUMIN SERPL ELPH-MCNC: 4.6 G/DL — SIGNIFICANT CHANGE UP (ref 3.3–5.2)
ALP SERPL-CCNC: 79 U/L — SIGNIFICANT CHANGE UP (ref 40–120)
ALT FLD-CCNC: 27 U/L — SIGNIFICANT CHANGE UP
ANION GAP SERPL CALC-SCNC: 18 MMOL/L — HIGH (ref 5–17)
ANION GAP SERPL CALC-SCNC: 25 MMOL/L — HIGH (ref 5–17)
APPEARANCE UR: ABNORMAL
APTT BLD: 34.8 SEC — SIGNIFICANT CHANGE UP (ref 27.5–35.5)
AST SERPL-CCNC: 29 U/L — SIGNIFICANT CHANGE UP
BACTERIA # UR AUTO: ABNORMAL
BASOPHILS # BLD AUTO: 0.09 K/UL — SIGNIFICANT CHANGE UP (ref 0–0.2)
BASOPHILS NFR BLD AUTO: 0.5 % — SIGNIFICANT CHANGE UP (ref 0–2)
BILIRUB SERPL-MCNC: 2.1 MG/DL — HIGH (ref 0.4–2)
BILIRUB UR-MCNC: NEGATIVE — SIGNIFICANT CHANGE UP
BUN SERPL-MCNC: 42 MG/DL — HIGH (ref 8–20)
BUN SERPL-MCNC: 43 MG/DL — HIGH (ref 8–20)
CALCIUM SERPL-MCNC: 10.2 MG/DL — SIGNIFICANT CHANGE UP (ref 8.6–10.2)
CALCIUM SERPL-MCNC: 8.1 MG/DL — LOW (ref 8.6–10.2)
CHLORIDE SERPL-SCNC: 100 MMOL/L — SIGNIFICANT CHANGE UP (ref 98–107)
CHLORIDE SERPL-SCNC: 94 MMOL/L — LOW (ref 98–107)
CO2 SERPL-SCNC: 18 MMOL/L — LOW (ref 22–29)
CO2 SERPL-SCNC: 20 MMOL/L — LOW (ref 22–29)
COLOR SPEC: YELLOW — SIGNIFICANT CHANGE UP
CREAT SERPL-MCNC: 5.49 MG/DL — HIGH (ref 0.5–1.3)
CREAT SERPL-MCNC: 5.94 MG/DL — HIGH (ref 0.5–1.3)
DIFF PNL FLD: ABNORMAL
EOSINOPHIL # BLD AUTO: 0.01 K/UL — SIGNIFICANT CHANGE UP (ref 0–0.5)
EOSINOPHIL NFR BLD AUTO: 0.1 % — SIGNIFICANT CHANGE UP (ref 0–6)
EPI CELLS # UR: SIGNIFICANT CHANGE UP
GLUCOSE SERPL-MCNC: 143 MG/DL — HIGH (ref 70–99)
GLUCOSE SERPL-MCNC: 200 MG/DL — HIGH (ref 70–99)
GLUCOSE UR QL: NEGATIVE MG/DL — SIGNIFICANT CHANGE UP
HCT VFR BLD CALC: 45.7 % — SIGNIFICANT CHANGE UP (ref 39–50)
HGB BLD-MCNC: 15.7 G/DL — SIGNIFICANT CHANGE UP (ref 13–17)
IMM GRANULOCYTES NFR BLD AUTO: 1.5 % — SIGNIFICANT CHANGE UP (ref 0–1.5)
INR BLD: 1.14 RATIO — SIGNIFICANT CHANGE UP (ref 0.88–1.16)
KETONES UR-MCNC: NEGATIVE — SIGNIFICANT CHANGE UP
LACTATE BLDV-MCNC: 1.9 MMOL/L — SIGNIFICANT CHANGE UP (ref 0.5–2)
LACTATE BLDV-MCNC: 3.4 MMOL/L — HIGH (ref 0.5–2)
LEUKOCYTE ESTERASE UR-ACNC: ABNORMAL
LYMPHOCYTES # BLD AUTO: 0.31 K/UL — LOW (ref 1–3.3)
LYMPHOCYTES # BLD AUTO: 1.6 % — LOW (ref 13–44)
MCHC RBC-ENTMCNC: 31.7 PG — SIGNIFICANT CHANGE UP (ref 27–34)
MCHC RBC-ENTMCNC: 34.4 GM/DL — SIGNIFICANT CHANGE UP (ref 32–36)
MCV RBC AUTO: 92.3 FL — SIGNIFICANT CHANGE UP (ref 80–100)
MONOCYTES # BLD AUTO: 1.1 K/UL — HIGH (ref 0–0.9)
MONOCYTES NFR BLD AUTO: 5.5 % — SIGNIFICANT CHANGE UP (ref 2–14)
NEUTROPHILS # BLD AUTO: 18.09 K/UL — HIGH (ref 1.8–7.4)
NEUTROPHILS NFR BLD AUTO: 90.8 % — HIGH (ref 43–77)
NITRITE UR-MCNC: NEGATIVE — SIGNIFICANT CHANGE UP
PH UR: 6 — SIGNIFICANT CHANGE UP (ref 5–8)
PLATELET # BLD AUTO: 228 K/UL — SIGNIFICANT CHANGE UP (ref 150–400)
POTASSIUM SERPL-MCNC: 3.8 MMOL/L — SIGNIFICANT CHANGE UP (ref 3.5–5.3)
POTASSIUM SERPL-MCNC: 3.9 MMOL/L — SIGNIFICANT CHANGE UP (ref 3.5–5.3)
POTASSIUM SERPL-SCNC: 3.8 MMOL/L — SIGNIFICANT CHANGE UP (ref 3.5–5.3)
POTASSIUM SERPL-SCNC: 3.9 MMOL/L — SIGNIFICANT CHANGE UP (ref 3.5–5.3)
PROT SERPL-MCNC: 7.9 G/DL — SIGNIFICANT CHANGE UP (ref 6.6–8.7)
PROT UR-MCNC: 100 MG/DL
PROTHROM AB SERPL-ACNC: 13.1 SEC — SIGNIFICANT CHANGE UP (ref 10.6–13.6)
RBC # BLD: 4.95 M/UL — SIGNIFICANT CHANGE UP (ref 4.2–5.8)
RBC # FLD: 13.4 % — SIGNIFICANT CHANGE UP (ref 10.3–14.5)
RBC CASTS # UR COMP ASSIST: ABNORMAL /HPF (ref 0–4)
SARS-COV-2 RNA SPEC QL NAA+PROBE: SIGNIFICANT CHANGE UP
SODIUM SERPL-SCNC: 136 MMOL/L — SIGNIFICANT CHANGE UP (ref 135–145)
SODIUM SERPL-SCNC: 139 MMOL/L — SIGNIFICANT CHANGE UP (ref 135–145)
SP GR SPEC: 1.01 — SIGNIFICANT CHANGE UP (ref 1.01–1.02)
UROBILINOGEN FLD QL: NEGATIVE MG/DL — SIGNIFICANT CHANGE UP
WBC # BLD: 19.89 K/UL — HIGH (ref 3.8–10.5)
WBC # FLD AUTO: 19.89 K/UL — HIGH (ref 3.8–10.5)
WBC UR QL: SIGNIFICANT CHANGE UP

## 2020-07-19 PROCEDURE — 99223 1ST HOSP IP/OBS HIGH 75: CPT

## 2020-07-19 PROCEDURE — 71045 X-RAY EXAM CHEST 1 VIEW: CPT | Mod: 26

## 2020-07-19 PROCEDURE — 99291 CRITICAL CARE FIRST HOUR: CPT

## 2020-07-19 PROCEDURE — 93010 ELECTROCARDIOGRAM REPORT: CPT

## 2020-07-19 PROCEDURE — 76770 US EXAM ABDO BACK WALL COMP: CPT | Mod: 26

## 2020-07-19 RX ORDER — SODIUM CHLORIDE 9 MG/ML
1000 INJECTION, SOLUTION INTRAVENOUS ONCE
Refills: 0 | Status: COMPLETED | OUTPATIENT
Start: 2020-07-19 | End: 2020-07-19

## 2020-07-19 RX ORDER — NOREPINEPHRINE BITARTRATE/D5W 8 MG/250ML
0.05 PLASTIC BAG, INJECTION (ML) INTRAVENOUS
Qty: 8 | Refills: 0 | Status: DISCONTINUED | OUTPATIENT
Start: 2020-07-19 | End: 2020-07-19

## 2020-07-19 RX ORDER — VANCOMYCIN HCL 1 G
1000 VIAL (EA) INTRAVENOUS EVERY 12 HOURS
Refills: 0 | Status: DISCONTINUED | OUTPATIENT
Start: 2020-07-19 | End: 2020-07-19

## 2020-07-19 RX ORDER — PIPERACILLIN AND TAZOBACTAM 4; .5 G/20ML; G/20ML
3.38 INJECTION, POWDER, LYOPHILIZED, FOR SOLUTION INTRAVENOUS ONCE
Refills: 0 | Status: COMPLETED | OUTPATIENT
Start: 2020-07-19 | End: 2020-07-19

## 2020-07-19 RX ORDER — SODIUM CHLORIDE 9 MG/ML
2000 INJECTION INTRAMUSCULAR; INTRAVENOUS; SUBCUTANEOUS ONCE
Refills: 0 | Status: COMPLETED | OUTPATIENT
Start: 2020-07-19 | End: 2020-07-19

## 2020-07-19 RX ORDER — SODIUM BICARBONATE 1 MEQ/ML
0.1 SYRINGE (ML) INTRAVENOUS
Qty: 75 | Refills: 0 | Status: DISCONTINUED | OUTPATIENT
Start: 2020-07-19 | End: 2020-07-21

## 2020-07-19 RX ORDER — ZOLPIDEM TARTRATE 10 MG/1
5 TABLET ORAL AT BEDTIME
Refills: 0 | Status: DISCONTINUED | OUTPATIENT
Start: 2020-07-19 | End: 2020-07-21

## 2020-07-19 RX ORDER — MIDODRINE HYDROCHLORIDE 2.5 MG/1
10 TABLET ORAL ONCE
Refills: 0 | Status: COMPLETED | OUTPATIENT
Start: 2020-07-19 | End: 2020-07-19

## 2020-07-19 RX ORDER — FAMOTIDINE 10 MG/ML
20 INJECTION INTRAVENOUS ONCE
Refills: 0 | Status: COMPLETED | OUTPATIENT
Start: 2020-07-19 | End: 2020-07-19

## 2020-07-19 RX ORDER — HEPARIN SODIUM 5000 [USP'U]/ML
5000 INJECTION INTRAVENOUS; SUBCUTANEOUS EVERY 12 HOURS
Refills: 0 | Status: DISCONTINUED | OUTPATIENT
Start: 2020-07-19 | End: 2020-07-21

## 2020-07-19 RX ORDER — SODIUM CHLORIDE 9 MG/ML
1000 INJECTION INTRAMUSCULAR; INTRAVENOUS; SUBCUTANEOUS
Refills: 0 | Status: DISCONTINUED | OUTPATIENT
Start: 2020-07-19 | End: 2020-07-19

## 2020-07-19 RX ORDER — PIPERACILLIN AND TAZOBACTAM 4; .5 G/20ML; G/20ML
3.38 INJECTION, POWDER, LYOPHILIZED, FOR SOLUTION INTRAVENOUS EVERY 12 HOURS
Refills: 0 | Status: DISCONTINUED | OUTPATIENT
Start: 2020-07-19 | End: 2020-07-21

## 2020-07-19 RX ORDER — VANCOMYCIN HCL 1 G
1000 VIAL (EA) INTRAVENOUS ONCE
Refills: 0 | Status: COMPLETED | OUTPATIENT
Start: 2020-07-19 | End: 2020-07-19

## 2020-07-19 RX ADMIN — Medication 1000 MILLIGRAM(S): at 09:57

## 2020-07-19 RX ADMIN — MIDODRINE HYDROCHLORIDE 10 MILLIGRAM(S): 2.5 TABLET ORAL at 10:30

## 2020-07-19 RX ADMIN — PIPERACILLIN AND TAZOBACTAM 25 GRAM(S): 4; .5 INJECTION, POWDER, LYOPHILIZED, FOR SOLUTION INTRAVENOUS at 18:43

## 2020-07-19 RX ADMIN — SODIUM CHLORIDE 1000 MILLILITER(S): 9 INJECTION, SOLUTION INTRAVENOUS at 09:51

## 2020-07-19 RX ADMIN — HEPARIN SODIUM 5000 UNIT(S): 5000 INJECTION INTRAVENOUS; SUBCUTANEOUS at 18:42

## 2020-07-19 RX ADMIN — Medication 125 MEQ/KG/HR: at 18:09

## 2020-07-19 RX ADMIN — Medication 30 MILLILITER(S): at 10:29

## 2020-07-19 RX ADMIN — SODIUM CHLORIDE 1000 MILLILITER(S): 9 INJECTION, SOLUTION INTRAVENOUS at 10:40

## 2020-07-19 RX ADMIN — FAMOTIDINE 20 MILLIGRAM(S): 10 INJECTION INTRAVENOUS at 10:29

## 2020-07-19 RX ADMIN — SODIUM CHLORIDE 2000 MILLILITER(S): 9 INJECTION INTRAMUSCULAR; INTRAVENOUS; SUBCUTANEOUS at 08:42

## 2020-07-19 RX ADMIN — SODIUM CHLORIDE 1000 MILLILITER(S): 9 INJECTION, SOLUTION INTRAVENOUS at 08:57

## 2020-07-19 RX ADMIN — Medication 20 MILLIGRAM(S): at 10:30

## 2020-07-19 RX ADMIN — ZOLPIDEM TARTRATE 5 MILLIGRAM(S): 10 TABLET ORAL at 21:36

## 2020-07-19 RX ADMIN — Medication 8.5 MICROGRAM(S)/KG/MIN: at 09:08

## 2020-07-19 RX ADMIN — Medication 250 MILLIGRAM(S): at 08:26

## 2020-07-19 RX ADMIN — SODIUM CHLORIDE 2000 MILLILITER(S): 9 INJECTION INTRAMUSCULAR; INTRAVENOUS; SUBCUTANEOUS at 08:00

## 2020-07-19 RX ADMIN — PIPERACILLIN AND TAZOBACTAM 3.38 GRAM(S): 4; .5 INJECTION, POWDER, LYOPHILIZED, FOR SOLUTION INTRAVENOUS at 08:26

## 2020-07-19 RX ADMIN — PIPERACILLIN AND TAZOBACTAM 200 GRAM(S): 4; .5 INJECTION, POWDER, LYOPHILIZED, FOR SOLUTION INTRAVENOUS at 08:00

## 2020-07-19 RX ADMIN — SODIUM CHLORIDE 1000 MILLILITER(S): 9 INJECTION, SOLUTION INTRAVENOUS at 09:50

## 2020-07-19 NOTE — PROGRESS NOTE ADULT - ASSESSMENT
61 YO male with history of hypertension, presenting right elbow cellulitis,  Leukocytosis & TAYLOR,         ·  Problem: Cellulitis of right upper extremity.  Plan: Likely source of symptoms.  Unclear etiology . ? Insect Bite ( ? Tick )    Blood cx,  Urine cx    Vancomycin , Zosyn ( Dose adjusted for eGFR )      ·  Problem: TAYLOR (acute kidney injury).  Plan: Baseline eGFR Not known,     ? ATN from Prolonged Hypovolemia , Hemoconcentration,

## 2020-07-19 NOTE — CONSULT NOTE ADULT - ASSESSMENT
KDIGO Care Bundle:    Avoidance of nephrotoxins/nephrotoxin medication adjustment  Medication dosage adjustment for kidney function  Continuous IVF administration (guided by CVP and testing of fluid responsiveness for 6 hours in combination with nephorology consultation)  Discontinuation of ACE/ARBs for first 48 postoperative hours  Close monitoring of serum Creatinine and UO  Acid-base, electrolyte and albumin status management  Avoidance of hyperglycemia for first 72 postoperative hours  Consider alternatives to radiocontrast administration  Optimizing hemodynamics:

## 2020-07-19 NOTE — ED PROVIDER NOTE - CLINICAL SUMMARY MEDICAL DECISION MAKING FREE TEXT BOX
Patient presenting hypotense, clammy, with signs of likely infection. Patient to be worked up with sepsis labwork including cultures, COVID, and CXR. To treat with fluids and broad spectrum abx. To reassess.

## 2020-07-19 NOTE — CONSULT NOTE ADULT - SUBJECTIVE AND OBJECTIVE BOX
Patient is a 60y old  Male who presents with a chief complaint of Fever (2020 15:15)    HPI:  60 yr male history of hypertension. No feeling well  in the last one week.  Woke 2 days ago with right elbow pain swelling and redness. Went to urgent care center where he was given antibiotics. Reacted to antibiotics with diarrhea chills chills and fever, weakness, shortness of breath.  Report fever 102 at home.  In ED two watery stool,  saturating upper 80s in room air.  Leukocytosis 19.8, elevated inflammatory markers, no transaminase elevation. Dismal renal function BUN/Cr 43/5.49 UA occasional bacturia.  CXR no evident pathology (2020 13:16)    PAST MEDICAL & SURGICAL HISTORY:  HTN (hypertension)    No significant past surgical history    FAMILY HISTORY: + Hypertension     Social History: No Illicit Drug use,     MEDICATIONS  (STANDING):  heparin   Injectable 5000 Unit(s) SubCutaneous every 12 hours  PARoxetine 20 milliGRAM(s) Oral daily  piperacillin/tazobactam IVPB.. 3.375 Gram(s) IV Intermittent every 12 hours  sodium bicarbonate  Infusion 0.103 mEq/kG/Hr (125 mL/Hr) IV Continuous <Continuous>    MEDICATIONS  (PRN):  zolpidem 5 milliGRAM(s) Oral at bedtime PRN Insomnia  zolpidem 5 milliGRAM(s) Oral at bedtime PRN Insomnia    Allergies    No Known Allergies    REVIEW OF SYSTEMS:    CONSTITUTIONAL: + fever,  No weight loss, or fatigue  EYES: No eye pain, visual disturbances, or discharge  ENMT:  No difficulty hearing, tinnitus, vertigo; No sinus or throat pain  NECK: No pain or stiffness  BREASTS: No pain, masses, or nipple discharge  RESPIRATORY: No cough, wheezing, chills or hemoptysis; No shortness of breath  CARDIOVASCULAR: No chest pain, palpitations, dizziness, or leg swelling  GASTROINTESTINAL: No abdominal or epigastric pain. No nausea, vomiting, or hematemesis; No diarrhea or constipation. No melena or hematochezia.  GENITOURINARY: No dysuria, frequency, hematuria, or incontinence  NEUROLOGICAL: No headaches, memory loss, loss of strength, numbness, or tremors  SKIN: No itching, burning, rashes, or lesions   LYMPH NODES: No enlarged glands  ENDOCRINE: No heat or cold intolerance; No hair loss  MUSCULOSKELETAL: No joint pain or swelling; No muscle, back, or extremity pain  PSYCHIATRIC: No depression, anxiety, mood swings, or difficulty sleeping  HEME/LYMPH: No easy bruising, or bleeding gums  ALLERGY AND IMMUNOLOGIC: No hives or eczema    Vital Signs Last 24 Hrs  T(C): 37.1 (2020 07:45), Max: 37.1 (2020 07:45)  T(F): 98.7 (2020 07:45), Max: 98.7 (2020 07:45)  HR: 87 (2020 07:45) (74 - 92)  BP: 104/57 (2020 07:45) (84/56 - 111/59)  BP(mean): 79 (2020 04:31) (70 - 79)  RR: 18 (2020 07:45) (18 - 20)  SpO2: 96% (2020 07:45) (86% - 98%)    PHYSICAL EXAM:    GENERAL: NAD, Febrile,   HEAD:  Atraumatic, Normocephalic  EYES: EOMI, PERRLA, conjunctiva and sclera clear  ENMT: Dry  mucous membranes,   NECK: Supple, No JVD,   NERVOUS SYSTEM:  Alert & Oriented X 3,   CHEST/LUNG: Clear to percussion bilaterally; No rales, rhonchi, wheezing, or rubs  HEART: Regular rate and rhythm; No murmurs, rubs, or gallops  ABDOMEN: Soft, Nontender, Nondistended; Bowel sounds present  EXTREMITIES:  2+ Peripheral Pulses, No clubbing, cyanosis, or edema, R - Elbow swollen & tender,   LYMPH: No lymphadenopathy noted  SKIN: No rashes or lesions    LABS:                        15.7   19.89 )-----------( 228      ( 2020 08:31 )             45.7     07-19    136  |  100  |  43.0<H>  ----------------------------<  200<H>  3.8   |  18.0<L>  |  5.49<H>    Ca    8.1<L>      2020 09:38    TPro  7.9  /  Alb  4.6  /  TBili  2.1<H>  /  DBili  x   /  AST  29  /  ALT  27  /  AlkPhos  79  07-19    PT/INR - ( 2020 08:31 )   PT: 13.1 sec;   INR: 1.14 ratio      PTT - ( 2020 08:31 )  PTT:34.8 sec  Urinalysis Basic - ( 2020 06:54 )    Color: Yellow / Appearance: Clear / S.005 / pH: x  Gluc: x / Ketone: Negative  / Bili: Negative / Urobili: Negative mg/dL   Blood: x / Protein: 30 mg/dL / Nitrite: Negative   Leuk Esterase: Trace / RBC: 6-10 /HPF / WBC 0-2   Sq Epi: x / Non Sq Epi: Occasional / Bacteria: Few    60 yr male with history of hypertension, presenting right elbow cellulitis. Leukocytosis,         ·  Problem: Cellulitis of right upper extremity.      Plan: Likely source of symptoms.  Unclear etiology . Patient said possible insect  bite.     Vancomycin , Zosyn ( Adjust Dose to eGFR )      ·  Problem: TAYLOR (acute kidney injury).      Plan: Patient report no history of renal disease    ·  Problem: Essential hypertension.      Plan: Hold BP Meds for now,

## 2020-07-19 NOTE — H&P ADULT - NSHPSOCIALHISTORY_GEN_ALL_CORE
No tobacco use   Use alcohol 5 shots of vodka daily   No drug or marijuana use   with adult children

## 2020-07-19 NOTE — ED PROVIDER NOTE - CARE PLAN
Principal Discharge DX:	Sepsis  Secondary Diagnosis:	TAYLOR (acute kidney injury) Principal Discharge DX:	Sepsis  Secondary Diagnosis:	TAYLOR (acute kidney injury)  Secondary Diagnosis:	Septic shock

## 2020-07-19 NOTE — H&P ADULT - NSHPPHYSICALEXAM_GEN_ALL_CORE
Normocephalic   EOMI PERRL  Neck supple no JVD  S1 and S2 regular   Chest CTA B   Abd soft + BS not tender   No pedal edema no calf tenderness . Right posterior elbow area firmness, redness, tenderness, cellulitis   No skin rash   Anxious mood .  Affect is appropriate

## 2020-07-19 NOTE — ED ADULT NURSE NOTE - OBJECTIVE STATEMENT
Pt AAOX3, Pt c/o of weakness and diarrhea for the past 4 days, pt SBP in the 70s in ED, pt has a little chest pain, pt denies SOB, pt denies N/V, pt has chills, pt denies any trouble urinating, pt respirations spontaneous, equal, and unlabored, pt diaphoretic upon arrival to ED, pt able to move all extremities well,

## 2020-07-19 NOTE — H&P ADULT - PROBLEM SELECTOR PLAN 2
No baseline number for Cr.   Patient report no history of renal disease  Likely ATN from dehydration volume contraction or antibiotic given at Urgent care for cellulitis.   Nephrology consult   IV hydration

## 2020-07-19 NOTE — ED PROVIDER NOTE - PROGRESS NOTE DETAILS
Spoke with wife, Margot [phone], regarding patient. States that patient has been feeling crummy last several days including chills and redness on the arm. Reports that patient had fever 102.9 yesterday. States that patient takes HTN medication, water pill, and antidepressant. Kathy EARLY: Pt with minimal responsiveness to 2L IVF, levo started, MICU consulted. Updated wife regarding patient's need to stay in hospital. Patient BP increased significantly to 170 at 0.05 Levo. Stopped infusion, MICU recommendations for PO Midodrine and additional fluids ordered. Patient states that he takes Paroxitine 20mg qd and is feeling anxious and did not take it today. Wife corroborated information on phone. Kathy EARLY: Pt with minimal responsiveness to 2L IVF, levo started, 3rd Liter of IVF started, MICU consulted. Kathy EARLY:  during 4L IVF, midodrine being given now. Will admit to medicine. Inflammatory markers elevated, COVID swab not yet resulted.

## 2020-07-19 NOTE — ED PROVIDER NOTE - OBJECTIVE STATEMENT
Pt is a 59yo M presenting with weakness and lethargy x 4 days. He reports that he has felt crummy with some intermittent fevers/chills at home. He states that yesterday he went to a walk-in clinic for evaluation of some redness on his forearm and was given antibiotics. Patient also notes a mild rash in his groin. Patient endorses one episode of emesis and diarrhea. Patient denies any chest pain, SOB, cough, dysuria, COVID contacts or symptoms, or pain of any kind.

## 2020-07-19 NOTE — ED PROVIDER NOTE - PHYSICAL EXAMINATION
General: Ill appearing male in no acute distress  HEENT: Diaphoretic. Dry mucous membranes. Oropharynx clear. No lymphadenopathy.  Eyes: No scleral icterus. EOMI. ENID.  Neck:. Soft and supple. Full ROM without pain. No midline tenderness  Cardiac: Regular rate and regular rhythm. No murmurs, rubs, gallops. Peripheral pulses 2+ and symmetric. No LE edema.  Resp: Lungs CTAB. Speaking in full sentences. No wheezes, rales or rhonchi.  Abd: Soft, non-tender, non-distended. No guarding or rebound. No scars, masses, or lesions.  Back: Spine midline and non-tender. No CVA tenderness.    Skin: R forearm, mild erythema, non-cellulitic. R groin small fungal rash appearance.   Neuro: AO x 3. Moves all extremities symmetrically. Motor strength and sensation grossly intact

## 2020-07-19 NOTE — H&P ADULT - PROBLEM SELECTOR PLAN 1
Likely source of symptoms.  Unclear etiology . Patient said possible bug bite.   Blood cx,  Urine cx  Vancomycin , Zosyn

## 2020-07-19 NOTE — ED PROVIDER NOTE - NS ED ROS FT
General: Endorses fever, chills  HEENT: Denies sensory changes, sore throat  Neck: Denies neck pain, neck stiffness  Resp: Denies coughing, SOB  Cardiovascular: Denies CP, palpitations, LE edema  GI: Endorsers nausea, vomiting, diarrhea. Denies abdominal pain, constipation, blood in stool  : Denies dysuria, hematuria, frequency, incontinence  MSK: Denies back pain  Neuro: Denies HA, dizziness, numbness, weakness  Skin: Endorses R arm rash, groin rash

## 2020-07-19 NOTE — ED ADULT TRIAGE NOTE - CHIEF COMPLAINT QUOTE
Pt c/o weakness, diarrhea and lethargy x 4 days, this morning had syncopal episode on the toilet, AOx4, resp even and unlabored

## 2020-07-19 NOTE — PROGRESS NOTE ADULT - SUBJECTIVE AND OBJECTIVE BOX
EMR Reviewed,    Vital Signs Last 24 Hrs,    T(C): 36.6 (2020 08:10), Max: 36.6 (2020 07:34)  T(F): 97.8 (2020 08:10), Max: 97.9 (2020 07:34)  HR: 74 (2020 14:17) (60 - 102)  BP: 84/56 (2020 14:17) (71/55 - 168/66)  BP(mean): 60 (2020 08:40) (56 - 60)  RR: 18 (2020 14:17) (18 - 20)  SpO2: 96% (2020 14:17) (86% - 100%)    136    |  100    |  43.0<H>  ----------------------------<  200<H>  Ca:8.1<L> (2020 09:38)  3.8     |  18.0<L>  |  5.49<H>    eGFR if Non : 10 <L>    TPro  7.9    /  Alb  4.6    /  TBili  2.1<H>  /  DBili  x      /  AST  29     /  ALT  27     /  AlkPhos  79     2020 08:31                                15.7   19.89<H> )-----------( 228      ( 2020 08:31 )                     45.7     Ferritin:1363 ng/mL<H> I (-19 @ 08:31)    Urinalysis Basic - ( 2020 12:19 )  Color: Yellow / Appearance: Slightly Turbid<!> / S.010 / pH: x  Gluc: x / Ketone: Negative  / Bili: Negative / Urobili: Negative mg/dL   Blood: x / Protein: 100 mg/dL<!> / Nitrite: Negative   Leuk Esterase: Moderate<!> / RBC: 11-25 /HPF<!> / WBC 0-2   Sq Epi: x / Non Sq Epi: Occasional / Bacteria: Occasional<!>    DX : Hypovolemia,    ? Urosepsis,    CK D - Stage ?     TAYLOR    KDIGO Care Bundle:    Avoidance of nephrotoxins/nephrotoxin medication adjustment  Medication dosage adjustment for kidney function  Continuous IVF administration (guided by CVP and testing of fluid responsiveness for 6 hours in combination with nephrology consultation)  Discontinuation of ACE/ARBs for first 48 postoperative hours  Close monitoring of serum Creatinine and UO  Acid-base, electrolyte and albumin status management  Avoidance of hyperglycemia for first 72 postoperative hours  Consider alternatives to radiocontrast administration  Optimizing hemodynamics:    Full consult to Follow,

## 2020-07-19 NOTE — H&P ADULT - HISTORY OF PRESENT ILLNESS
60 yr male 60 yr male history of hypertension. No feeling well  in the last one week.  Woke 2 days ago with right elbow pain swelling and redness. Went to urgent care center where he was given antibiotics. Reacted to antibiotics with diarrhea chills chills and fever, weakness, shortness of breath.  Report fever 102 at home.  In ED two watery stool,  saturating upper 80s in room air.  Leukocytosis 19.8, elevated inflammatory markers, no transaminase elevation. Dismal renal function BUN/Cr 43/5.49 UA occasional bacturia.  CXR no evident pathology

## 2020-07-19 NOTE — ED PROVIDER NOTE - ATTENDING CONTRIBUTION TO CARE
Kathy: I performed a face to face bedside interview with patient regarding history of present illness, review of symptoms and past medical history. I completed an independent physical exam.  I have discussed patient's plan of care with resident.   I agree with note as stated above including HISTORY OF PRESENT ILLNESS, HIV, PAST MEDICAL/SURGICAL/FAMILY/SOCIAL HISTORY, ALLERGIES AND HOME MEDICATIONS, REVIEW OF SYSTEMS, PHYSICAL EXAM, MEDICAL DECISION MAKING and any PROGRESS NOTES during the time I functioned as the attending physician for this patient unless otherwise noted. My brief assessment is as follows: 60M h/o HTN p/w weakness worsening x 4 days, fever chills x 2 days, Yesterday was started on abx for R forearm cellulitis. Also with rash to groin. one episode of NBNB emesis. Denies CP, SOB, cough, nasal congestion, anosmia, sick contacts, travel. Last iburpofen at 2am.  Gen: ill appearing clammy male in NAD  Head: NC/AT, no oropharyngeal edema or exudate  Neck: trachea midline  Resp:  No distress, CTAB  CV: tachyardic, regular rhythm  GI: soft, NTND  : mild erythema to inguinal area, no ttp, no crepitus. Normal scrotum.   Ext: no deformities  Neuro:  A&O appears non focal  Skin:  R posterior forearm with mild erythema, no ttp, no fluctuance. FROM at elbow.   Psych:  Normal affect and mood     Sepsis - undifferentiated  1) IV Access/IVF/LABS/Lactate (rpt after IVF if elevated)/UA/Cultures/covid swab  2) CXR  3) IV Abx  4) Admit

## 2020-07-20 LAB
A1C WITH ESTIMATED AVERAGE GLUCOSE RESULT: 5.9 % — HIGH (ref 4–5.6)
ALBUMIN SERPL ELPH-MCNC: 3.1 G/DL — LOW (ref 3.3–5.2)
ALP SERPL-CCNC: 51 U/L — SIGNIFICANT CHANGE UP (ref 40–120)
ALT FLD-CCNC: 21 U/L — SIGNIFICANT CHANGE UP
ANION GAP SERPL CALC-SCNC: 17 MMOL/L — SIGNIFICANT CHANGE UP (ref 5–17)
APPEARANCE UR: CLEAR — SIGNIFICANT CHANGE UP
AST SERPL-CCNC: 17 U/L — SIGNIFICANT CHANGE UP
BACTERIA # UR AUTO: ABNORMAL
BILIRUB SERPL-MCNC: 0.9 MG/DL — SIGNIFICANT CHANGE UP (ref 0.4–2)
BILIRUB UR-MCNC: NEGATIVE — SIGNIFICANT CHANGE UP
BUN SERPL-MCNC: 52 MG/DL — HIGH (ref 8–20)
CALCIUM SERPL-MCNC: 8.1 MG/DL — LOW (ref 8.6–10.2)
CHLORIDE SERPL-SCNC: 98 MMOL/L — SIGNIFICANT CHANGE UP (ref 98–107)
CHOLEST SERPL-MCNC: 132 MG/DL — SIGNIFICANT CHANGE UP (ref 110–199)
CO2 SERPL-SCNC: 23 MMOL/L — SIGNIFICANT CHANGE UP (ref 22–29)
COLOR SPEC: YELLOW — SIGNIFICANT CHANGE UP
CREAT ?TM UR-MCNC: 95 MG/DL — SIGNIFICANT CHANGE UP
CREAT SERPL-MCNC: 6.07 MG/DL — HIGH (ref 0.5–1.3)
DIFF PNL FLD: ABNORMAL
EPI CELLS # UR: SIGNIFICANT CHANGE UP
ESTIMATED AVERAGE GLUCOSE: 123 MG/DL — HIGH (ref 68–114)
GLUCOSE SERPL-MCNC: 112 MG/DL — HIGH (ref 70–99)
GLUCOSE UR QL: NEGATIVE MG/DL — SIGNIFICANT CHANGE UP
HDLC SERPL-MCNC: 16 MG/DL — LOW
HIV 1 & 2 AB SERPL IA.RAPID: SIGNIFICANT CHANGE UP
KETONES UR-MCNC: NEGATIVE — SIGNIFICANT CHANGE UP
LEUKOCYTE ESTERASE UR-ACNC: ABNORMAL
LIPID PNL WITH DIRECT LDL SERPL: 37 MG/DL — SIGNIFICANT CHANGE UP
NITRITE UR-MCNC: NEGATIVE — SIGNIFICANT CHANGE UP
OSMOLALITY UR: 308 MOSM/KG — SIGNIFICANT CHANGE UP (ref 300–1000)
PH UR: 6 — SIGNIFICANT CHANGE UP (ref 5–8)
POTASSIUM SERPL-MCNC: 3.2 MMOL/L — LOW (ref 3.5–5.3)
POTASSIUM SERPL-SCNC: 3.2 MMOL/L — LOW (ref 3.5–5.3)
POTASSIUM UR-SCNC: 15 MMOL/L — SIGNIFICANT CHANGE UP
PROT ?TM UR-MCNC: 41 MG/DL — HIGH (ref 0–12)
PROT SERPL-MCNC: 6.1 G/DL — LOW (ref 6.6–8.7)
PROT UR-MCNC: 30 MG/DL
PROT/CREAT UR-RTO: 0.4 RATIO — HIGH
RBC CASTS # UR COMP ASSIST: ABNORMAL /HPF (ref 0–4)
SARS-COV-2 RNA SPEC QL NAA+PROBE: SIGNIFICANT CHANGE UP
SODIUM SERPL-SCNC: 138 MMOL/L — SIGNIFICANT CHANGE UP (ref 135–145)
SODIUM UR-SCNC: 69 MMOL/L — SIGNIFICANT CHANGE UP
SP GR SPEC: 1 — LOW (ref 1.01–1.02)
TOTAL CHOLESTEROL/HDL RATIO MEASUREMENT: 8 RATIO — SIGNIFICANT CHANGE UP (ref 3.4–9.6)
TRIGL SERPL-MCNC: 393 MG/DL — HIGH (ref 10–200)
TSH SERPL-MCNC: 2.13 UIU/ML — SIGNIFICANT CHANGE UP (ref 0.27–4.2)
UROBILINOGEN FLD QL: NEGATIVE MG/DL — SIGNIFICANT CHANGE UP
WBC UR QL: SIGNIFICANT CHANGE UP

## 2020-07-20 PROCEDURE — 99233 SBSQ HOSP IP/OBS HIGH 50: CPT | Mod: GC

## 2020-07-20 PROCEDURE — 99233 SBSQ HOSP IP/OBS HIGH 50: CPT

## 2020-07-20 RX ORDER — SODIUM CHLORIDE 9 MG/ML
500 INJECTION INTRAMUSCULAR; INTRAVENOUS; SUBCUTANEOUS ONCE
Refills: 0 | Status: COMPLETED | OUTPATIENT
Start: 2020-07-20 | End: 2020-07-20

## 2020-07-20 RX ORDER — LACTOBACILLUS ACIDOPHILUS 100MM CELL
1 CAPSULE ORAL DAILY
Refills: 0 | Status: DISCONTINUED | OUTPATIENT
Start: 2020-07-20 | End: 2020-07-21

## 2020-07-20 RX ADMIN — PIPERACILLIN AND TAZOBACTAM 25 GRAM(S): 4; .5 INJECTION, POWDER, LYOPHILIZED, FOR SOLUTION INTRAVENOUS at 05:17

## 2020-07-20 RX ADMIN — Medication 20 MILLIGRAM(S): at 11:58

## 2020-07-20 RX ADMIN — HEPARIN SODIUM 5000 UNIT(S): 5000 INJECTION INTRAVENOUS; SUBCUTANEOUS at 05:17

## 2020-07-20 RX ADMIN — ZOLPIDEM TARTRATE 5 MILLIGRAM(S): 10 TABLET ORAL at 20:13

## 2020-07-20 RX ADMIN — PIPERACILLIN AND TAZOBACTAM 25 GRAM(S): 4; .5 INJECTION, POWDER, LYOPHILIZED, FOR SOLUTION INTRAVENOUS at 17:13

## 2020-07-20 RX ADMIN — SODIUM CHLORIDE 1000 MILLILITER(S): 9 INJECTION INTRAMUSCULAR; INTRAVENOUS; SUBCUTANEOUS at 00:46

## 2020-07-20 RX ADMIN — Medication 1 TABLET(S): at 17:13

## 2020-07-20 RX ADMIN — HEPARIN SODIUM 5000 UNIT(S): 5000 INJECTION INTRAVENOUS; SUBCUTANEOUS at 17:13

## 2020-07-20 NOTE — PROGRESS NOTE ADULT - ASSESSMENT
60 yr male with history of hypertension, presenting right elbow cellulitis. Leukocytosis, 60 yr male with history of hypertension, presenting with right elbow cellulitis, and renal failure.     Cellulitis of RUE  Likely source of leukocytosis.  Unclear etiology, possible bug bite.   Blood cx,  Urine cx pending  Continue Vancomycin and Zosyn, renally dosed    TAYLOR  Unknown baseline Cr, no known hx of renal disease, not improving on followup labs today.   Likely ATN from dehydration volume contraction or antibiotic given at Urgent care for cellulitis.   Nephrology following  IV hydration, encouraged PO fluids    HTN  Holding BP meds for low normal BP, avoiding nephrotoxic drugs    DVT ppx: heparin

## 2020-07-20 NOTE — ED ADULT NURSE REASSESSMENT NOTE - NS ED NURSE REASSESS COMMENT FT1
Assumed pt care @ 1930. Pt sitting upright in stretcher in no apparent signs of distress. Pt remains on cardiac monitor and , PIV site WNL. Pt A&Ox4 with no complaints of pain or discomfort at this time, refer to flowsheet and chart, pt ID band in place, pt safety maintained, pt hemodynamically stable, updated on plan of care. Awaiting COVID results and clean bed. Call light provided, fall safety reinforced. Will continue to monitor
Pt care endorsed to holding nurse Tony , all questions answered, pt in no distress at this time, breathing easy and unlabored,  care endorsed at this time
Pt sleeping in stretcher in no apparent signs of distress. Pt remains on cardiac monitor and  with supplemental O2 @ 2LPM, PIV site WNL. Pt status improved, blood pressure improved from bolus. refer to flowsheet and chart, pt ID band in place, pt safety maintained, pt hemodynamically stable, updated on plan of care. Awaiting 2nd covid results and clean bed. Call light provided, fall safety reinforced. Will continue to monitor
Pt sleeping in stretcher in no apparent signs of distress. Pt remains on cardiac monitor and , PIV site WNL. Pt with no complaints of pain or discomfort at this time, BP soft, BHAVYA Jacobsen made aware, IVF Bolus to be ordered. Refer to flowsheet and chart, pt ID band in place, pt safety maintained, pt hemodynamically stable, updated on plan of care. Awaiting covid results and clean bed. Call light provided, fall safety reinforced. Will continue to monitor
pt BP was 170s norepinephin discontinued, now pt BP maintaining in 90s
pt ambulated to bathroom with 1 standby assist. patient safe to ambulate with assist, pt reports mild lightheadedness upon position change. vs obtained. patient reports comfortable upon laying flat. continue to monitor
Pt received in the stretcher resting comfortably, no distress noted, breathing easy and unlabored, VSS, no chest pain reported, Sodium Bicarb infusing as per MD order,  pending COVID results, will continue to

## 2020-07-20 NOTE — ED ADULT NURSE REASSESSMENT NOTE - COMFORT CARE
side rails up/treatment delay explained/wait time explained/warm blanket provided/repositioned/plan of care explained

## 2020-07-20 NOTE — CHART NOTE - NSCHARTNOTEFT_GEN_A_CORE
Medicine PA- Cd for pt w/ hypotension 94/58 (MAP 70) Pt has been hypotensive since admission, has no c/o now, NSR on monitor, VSS- 87-18-96%-97.8 Will order NS 500cc bolus x1 and repeat BP, continue to monitor and call PA if status changes.

## 2020-07-20 NOTE — PROGRESS NOTE ADULT - SUBJECTIVE AND OBJECTIVE BOX
Patient is a 60y old  Male who presents with a chief complaint of Fever (2020 20:30)      INTERVAL HPI/OVERNIGHT EVENTS:  Patient seen awake in bed. He reports the swelling in his R arm has reduced and is not painful unless touched. Reports diarrhea, denies fever or hematochezia.       MEDICATIONS  (STANDING):  heparin   Injectable 5000 Unit(s) SubCutaneous every 12 hours  PARoxetine 20 milliGRAM(s) Oral daily  piperacillin/tazobactam IVPB.. 3.375 Gram(s) IV Intermittent every 12 hours  sodium bicarbonate  Infusion 0.103 mEq/kG/Hr (125 mL/Hr) IV Continuous <Continuous>    MEDICATIONS  (PRN):  zolpidem 5 milliGRAM(s) Oral at bedtime PRN Insomnia  zolpidem 5 milliGRAM(s) Oral at bedtime PRN Insomnia      Allergies    No Known Allergies    Intolerances        REVIEW OF SYSTEMS:  CONSTITUTIONAL: No fever, weight loss, or fatigue  EYES: No eye pain, visual disturbances, or discharge  ENMT:  No difficulty hearing, tinnitus, vertigo; No sinus or throat pain  NECK: No pain or stiffness  BREASTS: No pain, masses, or nipple discharge  RESPIRATORY: No cough, wheezing, chills or hemoptysis; No shortness of breath  CARDIOVASCULAR: No chest pain, palpitations, or lightheadedness  GASTROINTESTINAL: No abdominal or epigastric pain. No nausea, vomiting, or hematemesis; No diarrhea or constipation. No melena or hematochezia.  GENITOURINARY: No dysuria, frequency, hematuria, or incontinence  NEUROLOGICAL: No headaches, vertigo, memory loss, loss of strength, numbness, or tremors  SKIN: No itching, burning, rashes, or lesions   LYMPH NODES: No enlarged glands  ENDOCRINE: No heat or cold intolerance; No hair loss; No polydipsia or polyuria  MUSCULOSKELETAL: No back pain  PSYCHIATRIC: No depression, anxiety, or mood swings  HEME/LYMPH: No easy bruising, or bleeding gums  ALLERGY AND IMMUNOLOGIC: No hives or eczema    PHYSICAL EXAM:  GENERAL: NAD, well-groomed, well-developed  HEAD:  Atraumatic, Normocephalic  EYES: EOMI, PERRLA, conjunctiva and sclera clear  ENMT: Moist mucous membranes, Good dentition, No lesions; No tonsillar erythema, exudates, or enlargement   NECK: Supple, No JVD appreciated, Normal thyroid  NERVOUS SYSTEM:  Alert & Oriented X3, Good concentration; Bilateral LE mobile, sensation to light touch intact  CHEST/LUNG: Clear to auscultation bilaterally; No rales, rhonchi, wheezing, or rubs  HEART: Regular rate and rhythm; No murmurs, rubs, or gallops  ABDOMEN: Soft, Nontender, Nondistended; Bowel sounds present  EXTREMITIES:  2+ Peripheral Pulses, No clubbing or cyanosis  LYMPH: No lymphadenopathy noted  SKIN: No rashes or lesions  INCISION:  Dressing dry and intact    Vital Signs Last 24 Hrs  T(C): 36.6 (2020 14:14), Max: 37.1 (2020 07:45)  T(F): 97.9 (2020 14:14), Max: 98.7 (2020 07:45)  HR: 84 (2020 14:14) (78 - 92)  BP: 105/71 (2020 14:14) (94/58 - 111/59)  BP(mean): 79 (2020 04:31) (70 - 79)  RR: 18 (2020 14:14) (18 - 18)  SpO2: 95% (2020 12:22) (94% - 98%)    LABS:    2020 11:12    138    |  98     |  52.0   ----------------------------<  112    3.2     |  23.0   |  6.07     Ca    8.1        2020 11:12    TPro  6.1    /  Alb  3.1    /  TBili  0.9    /  DBili  x      /  AST  17     /  ALT  21     /  AlkPhos  51     2020 11:12    PT/INR - ( 2020 08:31 )   PT: 13.1 sec;   INR: 1.14 ratio         PTT - ( 2020 08:31 )  PTT:34.8 sec  Urinalysis Basic - ( 2020 06:54 )    Color: Yellow / Appearance: Clear / S.005 / pH: x  Gluc: x / Ketone: Negative  / Bili: Negative / Urobili: Negative mg/dL   Blood: x / Protein: 30 mg/dL / Nitrite: Negative   Leuk Esterase: Trace / RBC: 6-10 /HPF / WBC 0-2   Sq Epi: x / Non Sq Epi: Occasional / Bacteria: Few      CAPILLARY BLOOD GLUCOSE               [x] Consultant(s) Notes Reviewed Patient is a 60y old  Male who presents with a chief complaint of Fever (2020 20:30)      INTERVAL HPI/OVERNIGHT EVENTS:  Patient seen awake in bed. He reports the swelling in his R arm has reduced and is not painful unless touched. Reports diarrhea, denies fever or hematochezia. Reports no appetite but able to tolerate liquids without N/V.      MEDICATIONS  (STANDING):  heparin   Injectable 5000 Unit(s) SubCutaneous every 12 hours  PARoxetine 20 milliGRAM(s) Oral daily  piperacillin/tazobactam IVPB.. 3.375 Gram(s) IV Intermittent every 12 hours  sodium bicarbonate  Infusion 0.103 mEq/kG/Hr (125 mL/Hr) IV Continuous <Continuous>    MEDICATIONS  (PRN):  zolpidem 5 milliGRAM(s) Oral at bedtime PRN Insomnia  zolpidem 5 milliGRAM(s) Oral at bedtime PRN Insomnia      Allergies    No Known Allergies    Intolerances        REVIEW OF SYSTEMS:  CONSTITUTIONAL: No fever, weight loss, or fatigue  EYES: No eye pain, visual disturbances, or discharge  ENMT:  No difficulty hearing, tinnitus, vertigo; No sinus or throat pain  NECK: No pain or stiffness  BREASTS: No pain, masses, or nipple discharge  RESPIRATORY: No cough, wheezing, chills or hemoptysis; No shortness of breath  CARDIOVASCULAR: No chest pain, palpitations, or lightheadedness  GASTROINTESTINAL: No abdominal or epigastric pain. No nausea, vomiting, or hematemesis; Diarrhea, no melena or hematochezia.  GENITOURINARY: No dysuria, frequency, hematuria, or incontinence  NEUROLOGICAL: No headaches, vertigo, memory loss, loss of strength, numbness, or tremors  SKIN: Painful lesion on R elbow, swelling. No itching  LYMPH NODES: No enlarged glands  ENDOCRINE: No heat or cold intolerance; No hair loss; No polydipsia or polyuria  MUSCULOSKELETAL: No back pain  PSYCHIATRIC: No depression, anxiety, or mood swings  HEME/LYMPH: No easy bruising, or bleeding gums  ALLERGY AND IMMUNOLOGIC: No hives or eczema    PHYSICAL EXAM:  GENERAL: NAD, well-groomed, well-developed  HEAD:  Atraumatic, Normocephalic  EYES: EOMI, PERRLA, conjunctiva and sclera clear  ENMT: Moist mucous membranes, Good dentition, No lesions; No tonsillar erythema, exudates, or enlargement   NECK: Supple, No JVD appreciated, Normal thyroid  NERVOUS SYSTEM:  Alert & Oriented X3, Good concentration; Bilateral LE mobile, sensation to light touch intact  CHEST/LUNG: Clear to auscultation bilaterally; No rales, rhonchi, wheezing, or rubs  HEART: Regular rate and rhythm; No murmurs, rubs, or gallops  ABDOMEN: Soft, Nontender, Nondistended; Bowel sounds present  EXTREMITIES:  2+ Peripheral Pulses, No clubbing or cyanosis  LYMPH: No lymphadenopathy noted  SKIN: No rashes or lesions  INCISION:  Dressing dry and intact    Vital Signs Last 24 Hrs  T(C): 36.6 (2020 14:14), Max: 37.1 (2020 07:45)  T(F): 97.9 (2020 14:14), Max: 98.7 (2020 07:45)  HR: 84 (2020 14:14) (78 - 92)  BP: 105/71 (2020 14:14) (94/58 - 111/59)  BP(mean): 79 (2020 04:31) (70 - 79)  RR: 18 (2020 14:14) (18 - 18)  SpO2: 95% (2020 12:22) (94% - 98%)    LABS:    2020 11:12    138    |  98     |  52.0   ----------------------------<  112    3.2     |  23.0   |  6.07     Ca    8.1        2020 11:12    TPro  6.1    /  Alb  3.1    /  TBili  0.9    /  DBili  x      /  AST  17     /  ALT  21     /  AlkPhos  51     2020 11:12    PT/INR - ( 2020 08:31 )   PT: 13.1 sec;   INR: 1.14 ratio         PTT - ( 2020 08:31 )  PTT:34.8 sec  Urinalysis Basic - ( 2020 06:54 )    Color: Yellow / Appearance: Clear / S.005 / pH: x  Gluc: x / Ketone: Negative  / Bili: Negative / Urobili: Negative mg/dL   Blood: x / Protein: 30 mg/dL / Nitrite: Negative   Leuk Esterase: Trace / RBC: 6-10 /HPF / WBC 0-2   Sq Epi: x / Non Sq Epi: Occasional / Bacteria: Few      CAPILLARY BLOOD GLUCOSE               [x] Consultant(s) Notes Reviewed

## 2020-07-21 ENCOUNTER — TRANSCRIPTION ENCOUNTER (OUTPATIENT)
Age: 60
End: 2020-07-21

## 2020-07-21 VITALS
SYSTOLIC BLOOD PRESSURE: 135 MMHG | DIASTOLIC BLOOD PRESSURE: 83 MMHG | OXYGEN SATURATION: 91 % | HEART RATE: 92 BPM | RESPIRATION RATE: 18 BRPM | TEMPERATURE: 99 F

## 2020-07-21 DIAGNOSIS — Z91.89 OTHER SPECIFIED PERSONAL RISK FACTORS, NOT ELSEWHERE CLASSIFIED: ICD-10-CM

## 2020-07-21 LAB
ALBUMIN SERPL ELPH-MCNC: 3 G/DL — LOW (ref 3.3–5.2)
ANION GAP SERPL CALC-SCNC: 13 MMOL/L — SIGNIFICANT CHANGE UP (ref 5–17)
ANION GAP SERPL CALC-SCNC: 16 MMOL/L — SIGNIFICANT CHANGE UP (ref 5–17)
BUN SERPL-MCNC: 39 MG/DL — HIGH (ref 8–20)
BUN SERPL-MCNC: 44 MG/DL — HIGH (ref 8–20)
CALCIUM SERPL-MCNC: 8.1 MG/DL — LOW (ref 8.6–10.2)
CALCIUM SERPL-MCNC: 8.3 MG/DL — LOW (ref 8.6–10.2)
CHLORIDE SERPL-SCNC: 97 MMOL/L — LOW (ref 98–107)
CHLORIDE SERPL-SCNC: 99 MMOL/L — SIGNIFICANT CHANGE UP (ref 98–107)
CO2 SERPL-SCNC: 27 MMOL/L — SIGNIFICANT CHANGE UP (ref 22–29)
CO2 SERPL-SCNC: 28 MMOL/L — SIGNIFICANT CHANGE UP (ref 22–29)
CREAT SERPL-MCNC: 3.56 MG/DL — HIGH (ref 0.5–1.3)
CREAT SERPL-MCNC: 4.61 MG/DL — HIGH (ref 0.5–1.3)
CULTURE RESULTS: NO GROWTH — SIGNIFICANT CHANGE UP
GLUCOSE SERPL-MCNC: 107 MG/DL — HIGH (ref 70–99)
GLUCOSE SERPL-MCNC: 108 MG/DL — HIGH (ref 70–99)
HAV IGM SER-ACNC: SIGNIFICANT CHANGE UP
HBV CORE IGM SER-ACNC: SIGNIFICANT CHANGE UP
HBV SURFACE AG SER-ACNC: SIGNIFICANT CHANGE UP
HCT VFR BLD CALC: 32.9 % — LOW (ref 39–50)
HCV AB S/CO SERPL IA: 0.38 S/CO — SIGNIFICANT CHANGE UP (ref 0–0.99)
HCV AB S/CO SERPL IA: 0.38 S/CO — SIGNIFICANT CHANGE UP (ref 0–0.99)
HCV AB SERPL-IMP: SIGNIFICANT CHANGE UP
HCV AB SERPL-IMP: SIGNIFICANT CHANGE UP
HGB BLD-MCNC: 11.5 G/DL — LOW (ref 13–17)
MAGNESIUM SERPL-MCNC: 1.9 MG/DL — SIGNIFICANT CHANGE UP (ref 1.8–2.6)
MCHC RBC-ENTMCNC: 31.4 PG — SIGNIFICANT CHANGE UP (ref 27–34)
MCHC RBC-ENTMCNC: 35 GM/DL — SIGNIFICANT CHANGE UP (ref 32–36)
MCV RBC AUTO: 89.9 FL — SIGNIFICANT CHANGE UP (ref 80–100)
PHOSPHATE SERPL-MCNC: 2.8 MG/DL — SIGNIFICANT CHANGE UP (ref 2.4–4.7)
PLATELET # BLD AUTO: 158 K/UL — SIGNIFICANT CHANGE UP (ref 150–400)
POTASSIUM SERPL-MCNC: 3 MMOL/L — LOW (ref 3.5–5.3)
POTASSIUM SERPL-MCNC: 3.3 MMOL/L — LOW (ref 3.5–5.3)
POTASSIUM SERPL-SCNC: 3 MMOL/L — LOW (ref 3.5–5.3)
POTASSIUM SERPL-SCNC: 3.3 MMOL/L — LOW (ref 3.5–5.3)
RBC # BLD: 3.66 M/UL — LOW (ref 4.2–5.8)
RBC # FLD: 13.4 % — SIGNIFICANT CHANGE UP (ref 10.3–14.5)
SARS-COV-2 IGG SERPL QL IA: NEGATIVE — SIGNIFICANT CHANGE UP
SARS-COV-2 IGM SERPL IA-ACNC: 0.01 INDEX — SIGNIFICANT CHANGE UP
SODIUM SERPL-SCNC: 138 MMOL/L — SIGNIFICANT CHANGE UP (ref 135–145)
SODIUM SERPL-SCNC: 141 MMOL/L — SIGNIFICANT CHANGE UP (ref 135–145)
SPECIMEN SOURCE: SIGNIFICANT CHANGE UP
WBC # BLD: 11.69 K/UL — HIGH (ref 3.8–10.5)
WBC # FLD AUTO: 11.69 K/UL — HIGH (ref 3.8–10.5)

## 2020-07-21 PROCEDURE — 76770 US EXAM ABDO BACK WALL COMP: CPT

## 2020-07-21 PROCEDURE — 83605 ASSAY OF LACTIC ACID: CPT

## 2020-07-21 PROCEDURE — 84156 ASSAY OF PROTEIN URINE: CPT

## 2020-07-21 PROCEDURE — 83735 ASSAY OF MAGNESIUM: CPT

## 2020-07-21 PROCEDURE — 86140 C-REACTIVE PROTEIN: CPT

## 2020-07-21 PROCEDURE — 96365 THER/PROPH/DIAG IV INF INIT: CPT

## 2020-07-21 PROCEDURE — U0003: CPT

## 2020-07-21 PROCEDURE — 84133 ASSAY OF URINE POTASSIUM: CPT

## 2020-07-21 PROCEDURE — 87507 IADNA-DNA/RNA PROBE TQ 12-25: CPT

## 2020-07-21 PROCEDURE — 96366 THER/PROPH/DIAG IV INF ADDON: CPT

## 2020-07-21 PROCEDURE — 82728 ASSAY OF FERRITIN: CPT

## 2020-07-21 PROCEDURE — 99291 CRITICAL CARE FIRST HOUR: CPT | Mod: 25

## 2020-07-21 PROCEDURE — 71045 X-RAY EXAM CHEST 1 VIEW: CPT

## 2020-07-21 PROCEDURE — 96367 TX/PROPH/DG ADDL SEQ IV INF: CPT

## 2020-07-21 PROCEDURE — 96361 HYDRATE IV INFUSION ADD-ON: CPT

## 2020-07-21 PROCEDURE — 86769 SARS-COV-2 COVID-19 ANTIBODY: CPT

## 2020-07-21 PROCEDURE — 82962 GLUCOSE BLOOD TEST: CPT

## 2020-07-21 PROCEDURE — 85610 PROTHROMBIN TIME: CPT

## 2020-07-21 PROCEDURE — 86803 HEPATITIS C AB TEST: CPT

## 2020-07-21 PROCEDURE — 85027 COMPLETE CBC AUTOMATED: CPT

## 2020-07-21 PROCEDURE — 80069 RENAL FUNCTION PANEL: CPT

## 2020-07-21 PROCEDURE — 83935 ASSAY OF URINE OSMOLALITY: CPT

## 2020-07-21 PROCEDURE — 80053 COMPREHEN METABOLIC PANEL: CPT

## 2020-07-21 PROCEDURE — 85730 THROMBOPLASTIN TIME PARTIAL: CPT

## 2020-07-21 PROCEDURE — 80048 BASIC METABOLIC PNL TOTAL CA: CPT

## 2020-07-21 PROCEDURE — 84300 ASSAY OF URINE SODIUM: CPT

## 2020-07-21 PROCEDURE — 84443 ASSAY THYROID STIM HORMONE: CPT

## 2020-07-21 PROCEDURE — 87040 BLOOD CULTURE FOR BACTERIA: CPT

## 2020-07-21 PROCEDURE — 83036 HEMOGLOBIN GLYCOSYLATED A1C: CPT

## 2020-07-21 PROCEDURE — 93005 ELECTROCARDIOGRAM TRACING: CPT

## 2020-07-21 PROCEDURE — 85384 FIBRINOGEN ACTIVITY: CPT

## 2020-07-21 PROCEDURE — 99239 HOSP IP/OBS DSCHRG MGMT >30: CPT

## 2020-07-21 PROCEDURE — 81001 URINALYSIS AUTO W/SCOPE: CPT

## 2020-07-21 PROCEDURE — 80074 ACUTE HEPATITIS PANEL: CPT

## 2020-07-21 PROCEDURE — 84145 PROCALCITONIN (PCT): CPT

## 2020-07-21 PROCEDURE — 87635 SARS-COV-2 COVID-19 AMP PRB: CPT

## 2020-07-21 PROCEDURE — 86703 HIV-1/HIV-2 1 RESULT ANTBDY: CPT

## 2020-07-21 PROCEDURE — 80061 LIPID PANEL: CPT

## 2020-07-21 PROCEDURE — 99233 SBSQ HOSP IP/OBS HIGH 50: CPT

## 2020-07-21 PROCEDURE — 87086 URINE CULTURE/COLONY COUNT: CPT

## 2020-07-21 PROCEDURE — 36415 COLL VENOUS BLD VENIPUNCTURE: CPT

## 2020-07-21 PROCEDURE — 96375 TX/PRO/DX INJ NEW DRUG ADDON: CPT

## 2020-07-21 PROCEDURE — 82570 ASSAY OF URINE CREATININE: CPT

## 2020-07-21 RX ORDER — LACTOBACILLUS ACIDOPHILUS 100MM CELL
1 CAPSULE ORAL
Qty: 10 | Refills: 0
Start: 2020-07-21 | End: 2020-07-30

## 2020-07-21 RX ORDER — POTASSIUM CHLORIDE 20 MEQ
40 PACKET (EA) ORAL ONCE
Refills: 0 | Status: COMPLETED | OUTPATIENT
Start: 2020-07-21 | End: 2020-07-21

## 2020-07-21 RX ADMIN — Medication 1 TABLET(S): at 11:26

## 2020-07-21 RX ADMIN — Medication 20 MILLIGRAM(S): at 11:27

## 2020-07-21 RX ADMIN — PIPERACILLIN AND TAZOBACTAM 25 GRAM(S): 4; .5 INJECTION, POWDER, LYOPHILIZED, FOR SOLUTION INTRAVENOUS at 06:05

## 2020-07-21 RX ADMIN — HEPARIN SODIUM 5000 UNIT(S): 5000 INJECTION INTRAVENOUS; SUBCUTANEOUS at 06:06

## 2020-07-21 RX ADMIN — Medication 125 MEQ/KG/HR: at 01:06

## 2020-07-21 RX ADMIN — Medication 40 MILLIEQUIVALENT(S): at 11:27

## 2020-07-21 NOTE — PROGRESS NOTE ADULT - ASSESSMENT
61 Yo male with history of hypertension, presenting w.  right elbow cellulitis,  Leukocytosis & TAYLOR,         ·  Problem: Cellulitis of right upper extremity.  Plan: Likely source of symptoms.  Unclear etiology . ? Insect Bite ( ? Tick )    Blood cx,  Urine cx    Vancomycin , Zosyn ( Dose adjusted for eGFR )      ·  Problem: TAYLOR (acute kidney injury).  Plan: Baseline eGFR Not known,     ? ATN from Prolonged Hypovolemia , Hemoconcentration,      Cellulitis of RUE    AK I :    KDIGO Care Bundle:    Avoidance of nephrotoxins/nephrotoxin medication adjustment  Medication dosage adjustment for kidney function  Continuous IVF administration (guided by CVP and testing of fluid responsiveness for 6 hours in combination with nephrology consultation)  Discontinuation of ACE/ARBs for first 48 postoperative hours  Close monitoring of serum Creatinine and UO  Acid-base, electrolyte and albumin status management  Avoidance of hyperglycemia for first 72 postoperative hours  Consider alternatives to radiocontrast administration  Optimizing hemodynamics:    HTN  Holding BP meds for low normal BP,

## 2020-07-21 NOTE — DISCHARGE NOTE PROVIDER - NSDCMRMEDTOKEN_GEN_ALL_CORE_FT
clindamycin 150 mg oral capsule: 3 cap(s) orally 3 times a day   lactobacillus acidophilus oral capsule: 1 cap(s) orally once a day while on antibiotics

## 2020-07-21 NOTE — DISCHARGE NOTE PROVIDER - PROVIDER TOKENS
FREE:[LAST:[Delgado],FIRST:[Brando],PHONE:[phone]],FAX:[phone]],ADDRESS:[[address] [place] [address]],FOLLOWUP:[1 week],ESTABLISHEDPATIENT:[T]],PROVIDER:[TOKEN:[[id number]],FOLLOWUP:[2 weeks]] PROVIDER:[TOKEN:[[id number]],FOLLOWUP:[2 weeks]],FREE:[LAST:[Kane],FIRST:[Tracee],PHONE:[phone]],FAX:[phone]],ADDRESS:[[address] [place] [address]],FOLLOWUP:[1 week],ESTABLISHEDPATIENT:[T]],FREE:[LAST:[kane],FIRST:[tracee],PHONE:[(   [phone]    -],FAX:[phone]]]

## 2020-07-21 NOTE — DISCHARGE NOTE PROVIDER - HOSPITAL COURSE
60 yr male history of hypertension. No feeling well  in the last one week.  Woke 2 days ago with right elbow pain swelling and redness. Went to urgent care center where he was given antibiotics. Reacted to antibiotics with diarrhea chills chills and fever, weakness, shortness of breath.  Report fever 102 at home.  In ED two watery stool,  saturating upper 80s in room air.  Leukocytosis 19.8, elevated inflammatory markers, no transaminase elevation. Dismal renal function BUN/Cr 43/5.49 UA occasional bacturia.  CXR no evident pathology             PHYSICAL EXAM:    GENERAL: NAD, well-groomed, well-developed    HEAD:  Atraumatic, Normocephalic    EYES: EOMI, PERRLA, conjunctiva and sclera clear    ENMT: Moist mucous membranes, Good dentition, No lesions; No tonsillar erythema, exudates, or enlargement     NECK: Supple, No JVD appreciated, Normal thyroid    NERVOUS SYSTEM:  Alert & Oriented X3, Good concentration; Bilateral LE mobile, sensation to light touch intact    CHEST/LUNG: Clear to auscultation bilaterally; No rales, rhonchi, wheezing, or rubs    HEART: Regular rate and rhythm; No murmurs, rubs, or gallops    ABDOMEN: Soft, Nontender, Nondistended    EXTREMITIES:  2+ Peripheral Pulses, No clubbing or cyanosis    LYMPH: No lymphadenopathy noted    SKIN: erythema of R posterior elbow, blanches on palpation, reduced in swelling compared to previous, some new erythema over R inferior arm medially along lesion    INCISION:  Dressing dry and intact            Cellulitis of RUE    Likely source of leukocytosis.  Unclear etiology, possible bug bite.     Blood cx,  Urine cx negative    On Vancomycin and Zosyn, renally dosed, switched to Clindamycin 450mg PO TID x 10 days        TAYLOR    Unknown baseline Cr, no known hx of renal disease, trending downward from 6 -> 4.6    Likely ATN from dehydration volume contraction or antibiotic given at Urgent care for cellulitis.     Nephrology following, plan for outpatient followup    IV hydration, encouraged PO fluids        HTN    Holding BP meds for low normal BP, avoiding nephrotoxic drugs. Will hold on discharge and have patient followup with outpatient PCP

## 2020-07-21 NOTE — PROGRESS NOTE ADULT - SUBJECTIVE AND OBJECTIVE BOX
Patient is a 60y old  Male who presents with a chief complaint of Fever (2020 15:15)    HPI:  60 yr male history of hypertension. No feeling well  in the last one week.  Woke 2 days ago with right elbow pain swelling and redness. Went to urgent care center where he was given antibiotics. Reacted to antibiotics with diarrhea chills chills and fever, weakness, shortness of breath.  Report fever 102 at home.  In ED two watery stool,  saturating upper 80s in room air.  Leukocytosis 19.8, elevated inflammatory markers, no transaminase elevation. Dismal renal function BUN/Cr 43/5.49 UA occasional bacturia.  CXR no evident pathology (2020 13:16)    PAST MEDICAL & SURGICAL HISTORY:  HTN (hypertension)    No significant past surgical history    FAMILY HISTORY: + Hypertension     Social History: No Illicit Drug use,     MEDICATIONS  (STANDING):  heparin   Injectable 5000 Unit(s) SubCutaneous every 12 hours  PARoxetine 20 milliGRAM(s) Oral daily  piperacillin/tazobactam IVPB.. 3.375 Gram(s) IV Intermittent every 12 hours  sodium bicarbonate  Infusion 0.103 mEq/kG/Hr (125 mL/Hr) IV Continuous <Continuous>    MEDICATIONS  (PRN):  zolpidem 5 milliGRAM(s) Oral at bedtime PRN Insomnia  zolpidem 5 milliGRAM(s) Oral at bedtime PRN Insomnia    Allergies    No Known Allergies    REVIEW OF SYSTEMS:    CONSTITUTIONAL: + fever,  No weight loss, or fatigue  EYES: No eye pain, visual disturbances, or discharge  ENMT:  No difficulty hearing, tinnitus, vertigo; No sinus or throat pain  NECK: No pain or stiffness  BREASTS: No pain, masses, or nipple discharge  RESPIRATORY: No cough, wheezing, chills or hemoptysis; No shortness of breath  CARDIOVASCULAR: No chest pain, palpitations, dizziness, or leg swelling  GASTROINTESTINAL: No abdominal or epigastric pain. No nausea, vomiting, or hematemesis; No diarrhea or constipation. No melena or hematochezia.  GENITOURINARY: No dysuria, frequency, hematuria, or incontinence  NEUROLOGICAL: No headaches, memory loss, loss of strength, numbness, or tremors  SKIN: No itching, burning, rashes, or lesions   LYMPH NODES: No enlarged glands  ENDOCRINE: No heat or cold intolerance; No hair loss  MUSCULOSKELETAL: No joint pain or swelling; No muscle, back, or extremity pain  PSYCHIATRIC: No depression, anxiety, mood swings, or difficulty sleeping  HEME/LYMPH: No easy bruising, or bleeding gums  ALLERGY AND IMMUNOLOGIC: No hives or eczema    Vital Signs Last 24 Hrs:    T(C): 36.9 (2020 07:59), Max: 36.9 (2020 07:59)  T(F): 98.5 (2020 07:59), Max: 98.5 (2020 07:59)  HR: 91 (2020 07:59) (43 - 91)  BP: 113/69 (2020 07:59) (105/65 - 121/68)  RR: 18 (2020 07:59) (16 - 18)  SpO2: 95% (2020 23:23) (95% - 95%)    T(C): 37.1 (2020 07:45), Max: 37.1 (2020 07:45)  T(F): 98.7 (2020 07:45), Max: 98.7 (2020 07:45)  HR: 87 (2020 07:45) (74 - 92)  BP: 104/57 (2020 07:45) (84/56 - 111/59)  BP(mean): 79 (2020 04:31) (70 - 79)  RR: 18 (2020 07:45) (18 - 20)  SpO2: 96% (2020 07:45) (86% - 98%)    PHYSICAL EXAM:    GENERAL: NAD, Febrile,   HEAD:  Atraumatic, Normocephalic  EYES: EOMI, PERRLA, conjunctiva and sclera clear  ENMT: Dry  mucous membranes,   NECK: Supple, No JVD,   NERVOUS SYSTEM:  Alert & Oriented X 3,   CHEST/LUNG: Clear to percussion bilaterally; No rales, rhonchi, wheezing, or rubs  HEART: Regular rate and rhythm; No murmurs, rubs, or gallops  ABDOMEN: Soft, Nontender, Nondistended; Bowel sounds present  EXTREMITIES:  2+ Peripheral Pulses, No clubbing, cyanosis, or edema, R - Elbow swollen & tender,   LYMPH: No lymphadenopathy noted  SKIN: No rashes or lesions    LABS::    141    |  99     |  44.0<H>  ----------------------------<  107<H>  Ca:8.1<L> (2020 07:56)  3.0<L>   |  27.0   |  4.61<H>    eGFR if Non : 13 <L>  eGFR if : 15 <L>    TPro  6.1<L>  /  Alb  3.1<L>  /  TBili  0.9    /  DBili  x      /  AST  17     /  ALT  21     /  AlkPhos  51     2020 11:12                              11.5<L>  11.69<H> )-----------( 158      ( 2020 07:57 )                  32.9<L>    M.9 mg/dL   ( @ 07:56)    Urinalysis Basic - ( 2020 06:54 )  Color: Yellow / Appearance: Clear / S.005<L> / pH: x  Gluc: x / Ketone: Negative  / Bili: Negative / Urobili: Negative mg/dL   Blood: x / Protein: 30 mg/dL<!> / Nitrite: Negative   Leuk Esterase: Trace<!> / RBC: 6-10 /HPF<!> / WBC 0-2   Sq Epi: x / Non Sq Epi: Occasional / Bacteria: Few<!>    UCr:95 mg/dL P/C Ratio:0.4 Ratio<H>   Rashawn:69 mmol/L UOsm:308 mosm/kg UK:15 mmol/L ( @ 06:55)                       15.7   19.89 )-----------( 228      ( 2020 08:31 )             45.7     07-19    136  |  100  |  43.0<H>  ----------------------------<  200<H>  3.8   |  18.0<L>  |  5.49<H>    Ca    8.1<L>      2020 09:38    TPro  7.9  /  Alb  4.6  /  TBili  2.1<H>  /  DBili  x   /  AST  29  /  ALT  27  /  AlkPhos  79  07-19    PT/INR - ( 2020 08:31 )   PT: 13.1 sec;   INR: 1.14 ratio      PTT - ( 2020 08:31 )  PTT:34.8 sec  Urinalysis Basic - ( 2020 06:54 )    Color: Yellow / Appearance: Clear / S.005 / pH: x  Gluc: x / Ketone: Negative  / Bili: Negative / Urobili: Negative mg/dL   Blood: x / Protein: 30 mg/dL / Nitrite: Negative   Leuk Esterase: Trace / RBC: 6-10 /HPF / WBC 0-2   Sq Epi: x / Non Sq Epi: Occasional / Bacteria: Few    60 yr male with history of hypertension, presenting right elbow cellulitis. Leukocytosis,         ·  Problem: Cellulitis of right upper extremity.      Plan: Likely source of symptoms.  Unclear etiology . Patient said possible insect  bite.     Vancomycin , Zosyn ( Adjust Dose to eGFR )      ·  Problem: TAYLOR (acute kidney injury).      Plan: Patient report no history of renal disease    ·  Problem: Essential hypertension.      Plan: Hold BP Meds for now,     DX : Hypovolemia,    ? Urosepsis,    CK D - Stage ?

## 2020-07-21 NOTE — DISCHARGE NOTE NURSING/CASE MANAGEMENT/SOCIAL WORK - PATIENT PORTAL LINK FT
You can access the FollowMyHealth Patient Portal offered by [facility] by registering at the following website: [email or web address]. By joining [facility]’s FollowMyHealth portal, you will also be able to view your health information using other applications (apps) compatible with our system.

## 2020-07-21 NOTE — DISCHARGE NOTE PROVIDER - NSDCCPCAREPLAN_GEN_ALL_CORE_FT
PRINCIPAL DISCHARGE DIAGNOSIS  Diagnosis: TAYLOR (acute kidney injury)  Assessment and Plan of Treatment: It's unclear what caused the condition, whether it was a reaction to the antibiotics or from infection or from dehydration. Follow up with your primary doctor and Dr. Cage in clinic for repeat blood test in a few weeks      SECONDARY DISCHARGE DIAGNOSES  Diagnosis: Cellulitis of right upper extremity  Assessment and Plan of Treatment: It's unclear what caused the infection though it likely was from a bug bite. Continue the antibiotics for 10 days and be sure to finish it. Take the probiotic pill to avoid more diarrhea.    Diagnosis: TAYLOR (acute kidney injury)  Assessment and Plan of Treatment: It's unclear what caused the condition, whether it was a reaction to the antibiotics or from infection or from dehydration. Follow up with your primary doctor and Dr. Cage in clinic for repeat blood test in a few weeks PRINCIPAL DISCHARGE DIAGNOSIS  Diagnosis: TAYLOR (acute kidney injury)  Assessment and Plan of Treatment: It's unclear what caused the condition, whether it was a reaction to the antibiotics or from infection or from dehydration. Follow up with your primary doctor and Dr. Caeg in clinic for repeat blood test in a few weeks      SECONDARY DISCHARGE DIAGNOSES  Diagnosis: Cellulitis of right upper extremity  Assessment and Plan of Treatment: It's unclear what caused the infection though it likely was from a bug bite. Continue the antibiotics for 10 days and be sure to finish it. Take the probiotic pill to avoid more diarrhea.    Diagnosis: TAYLOR (acute kidney injury)  Assessment and Plan of Treatment: It's unclear what caused the condition, whether it was a reaction to the antibiotics or from infection or from dehydration. Follow up with your primary doctor and Dr. Cage in clinic for repeat blood test in a few weeks

## 2020-07-21 NOTE — DISCHARGE NOTE PROVIDER - CARE PROVIDER_API CALL
Brando Delgado  [address] [place] [address]  Phone: [phone]  Fax: [phone]  Established Patient  Follow Up Time: 1 week    Doc Cage  INTERNAL MEDICINE  [address] [place] [address]  Phone: [phone]  Fax: [phone]  Follow Up Time: 2 weeks Doc Cage  INTERNAL MEDICINE  [address] [place] [address]  Phone: [phone]  Fax: [phone]  Follow Up Time: 2 weeks    Tracee Middleton  [address] [place] [address]  Phone: [phone]  Fax: [phone]  Established Patient  Follow Up Time: 1 week    tracee middleton  Phone: (   )    -  Fax: [phone]  Follow Up Time:

## 2020-07-22 LAB
CULTURE RESULTS: SIGNIFICANT CHANGE UP
SPECIMEN SOURCE: SIGNIFICANT CHANGE UP

## 2020-07-24 LAB
CULTURE RESULTS: SIGNIFICANT CHANGE UP
CULTURE RESULTS: SIGNIFICANT CHANGE UP
SPECIMEN SOURCE: SIGNIFICANT CHANGE UP
SPECIMEN SOURCE: SIGNIFICANT CHANGE UP

## 2020-12-16 PROBLEM — Z87.09 HISTORY OF UPPER RESPIRATORY INFECTION: Status: RESOLVED | Noted: 2018-02-21 | Resolved: 2020-12-16

## 2021-10-06 PROBLEM — I10 ESSENTIAL HYPERTENSION: Status: ACTIVE | Noted: 2018-02-20

## 2022-10-07 PROBLEM — I10 ESSENTIAL (PRIMARY) HYPERTENSION: Chronic | Status: ACTIVE | Noted: 2020-07-19

## 2022-10-26 ENCOUNTER — APPOINTMENT (OUTPATIENT)
Dept: ORTHOPEDIC SURGERY | Facility: CLINIC | Age: 62
End: 2022-10-26

## 2022-10-26 VITALS
DIASTOLIC BLOOD PRESSURE: 97 MMHG | SYSTOLIC BLOOD PRESSURE: 164 MMHG | TEMPERATURE: 98.1 F | WEIGHT: 200 LBS | HEIGHT: 65 IN | BODY MASS INDEX: 33.32 KG/M2 | HEART RATE: 74 BPM

## 2022-10-26 PROCEDURE — 73030 X-RAY EXAM OF SHOULDER: CPT | Mod: LT

## 2022-10-26 PROCEDURE — 99204 OFFICE O/P NEW MOD 45 MIN: CPT | Mod: 25

## 2022-10-26 PROCEDURE — 20610 DRAIN/INJ JOINT/BURSA W/O US: CPT | Mod: LT

## 2022-10-26 NOTE — DISCUSSION/SUMMARY
[FreeTextEntry1] : 1.  I am hopeful that the injection to bilateral shoulders will lead to significant improvement of his early onset osteoarthritis bilaterally.\par \par #2 I would like to see him back on an as-needed basis.

## 2022-10-26 NOTE — ASSESSMENT
[FreeTextEntry1] : ASSESSMENT:\par \par The patient comes in today with chronic bilateral shoulder pain for multiple years now inhibiting him from performing work activities.  This has worsened throughout the years.  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 Potential complications include bleeding and infection. \par Alcohol was used to prep the area. Ethyl chloride spray was used as a topical anesthetic. \par Patient was positioned in a seated position with shoulder adducted and internally rotated.  \par \par Using sterile technique, a 22 gauge spinal needle injection needle was used to inject 5mL of lidocaine and 2mL of kenalog directed from a lateral aspect into the shoulder joint.  \par A bandage was applied.  The patient tolerated the procedure well. \par \par Patient instructed to avoid strenuous activity for 2 day(s). \par Specifically counseled regarding the signs and symptoms of potential infection to present promptly to clinic or hospital if such signs and symptoms arise.\par \par He was adequately and thoroughly informed of my assessment of their current condition(s). \par \par For him we had a discussion regarding shoulder arthroscopy in the future if his symptoms do not improve.  This would likely be for a debridement.  We also discussed shoulder arthroplasty.  At this point he is not ready for this and would like to continue with nonoperative management in the form of injections\par \par

## 2022-10-26 NOTE — HISTORY OF PRESENT ILLNESS
[FreeTextEntry1] : Mr. 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  He is an active individual and he states that he has progressively lost range of motion of bilateral shoulders as well as complains of significant stiffness and pain with his daily activities on bilateral shoulders.  It typically does not wake him up.

## 2022-10-26 NOTE — PHYSICAL EXAM
[de-identified] : General Exam:\par \par [The patient is alert and oriented, is well appearing, and in no apparent distress]\par [Cervical spine mobility is full in all directions]\par [There are no apparent skin lesions, ulcers, or masses]\par [Inspection of the extremities shows no signs of skin changes or muscle asymmetry]\par \par Examination of his bilateral shoulders reveals passive loss of range of motion for external rotation bilaterally.  He is able to achieve 20 to 25 degrees of external rotation bilaterally and he is able to keep his shoulder and external rotation.  He has good strength of his external rotation as well as AB duction.\par He has pain with shoulder abduction passively however he is able to achieve 140 degrees bilaterally.\par His internal rotation is to lower back bilaterally.  Belly press test is intact.  He does not have Hornblower's bilaterally\par  [de-identified] : [4] views of left and right shoulders were obtained today in my office and were seen by me and discussed with the patient. \par These show findings consistent with early onset osteoarthritis of the shoulders.  There is mild superior migration as well.\par \par

## 2022-10-26 NOTE — CONSULT LETTER
[Dear  ___] : Dear  [unfilled], [Consult Letter:] : I had the pleasure of evaluating your patient, [unfilled]. [Please see my note below.] : Please see my note below. [Consult Closing:] : Thank you very much for allowing me to participate in the care of this patient.  If you have any questions, please do not hesitate to contact me. [Sincerely,] : Sincerely, [FreeTextEntry2] : Brando Delgado MD\par [address] [facility]\[place] [address]\par \par  [FreeTextEntry3] : Sam Ring MD

## 2024-02-01 ENCOUNTER — EMERGENCY (EMERGENCY)
Facility: HOSPITAL | Age: 64
LOS: 1 days | Discharge: HOME | End: 2024-02-01
Attending: EMERGENCY MEDICINE
Payer: COMMERCIAL

## 2024-02-01 VITALS
RESPIRATION RATE: 16 BRPM | HEART RATE: 76 BPM | OXYGEN SATURATION: 96 % | TEMPERATURE: 98 F | SYSTOLIC BLOOD PRESSURE: 149 MMHG | DIASTOLIC BLOOD PRESSURE: 90 MMHG

## 2024-02-01 LAB
ANION GAP SERPL CALC-SCNC: 14 MMOL/L — SIGNIFICANT CHANGE UP (ref 5–17)
BASOPHILS # BLD AUTO: 0.03 K/UL — SIGNIFICANT CHANGE UP (ref 0–0.2)
BASOPHILS NFR BLD AUTO: 0.2 % — SIGNIFICANT CHANGE UP (ref 0–2)
BUN SERPL-MCNC: 20.8 MG/DL — HIGH (ref 8–20)
CALCIUM SERPL-MCNC: 9.2 MG/DL — SIGNIFICANT CHANGE UP (ref 8.4–10.5)
CHLORIDE SERPL-SCNC: 99 MMOL/L — SIGNIFICANT CHANGE UP (ref 96–108)
CO2 SERPL-SCNC: 25 MMOL/L — SIGNIFICANT CHANGE UP (ref 22–29)
CREAT SERPL-MCNC: 1.09 MG/DL — SIGNIFICANT CHANGE UP (ref 0.5–1.3)
EGFR: 76 ML/MIN/1.73M2 — SIGNIFICANT CHANGE UP
EOSINOPHIL # BLD AUTO: 0.26 K/UL — SIGNIFICANT CHANGE UP (ref 0–0.5)
EOSINOPHIL NFR BLD AUTO: 1.7 % — SIGNIFICANT CHANGE UP (ref 0–6)
GLUCOSE SERPL-MCNC: 142 MG/DL — HIGH (ref 70–99)
HCT VFR BLD CALC: 39.4 % — SIGNIFICANT CHANGE UP (ref 39–50)
HGB BLD-MCNC: 14.2 G/DL — SIGNIFICANT CHANGE UP (ref 13–17)
IMM GRANULOCYTES NFR BLD AUTO: 0.7 % — SIGNIFICANT CHANGE UP (ref 0–0.9)
LYMPHOCYTES # BLD AUTO: 1.84 K/UL — SIGNIFICANT CHANGE UP (ref 1–3.3)
LYMPHOCYTES # BLD AUTO: 12.3 % — LOW (ref 13–44)
MCHC RBC-ENTMCNC: 32.3 PG — SIGNIFICANT CHANGE UP (ref 27–34)
MCHC RBC-ENTMCNC: 36 GM/DL — SIGNIFICANT CHANGE UP (ref 32–36)
MCV RBC AUTO: 89.5 FL — SIGNIFICANT CHANGE UP (ref 80–100)
MONOCYTES # BLD AUTO: 1.02 K/UL — HIGH (ref 0–0.9)
MONOCYTES NFR BLD AUTO: 6.8 % — SIGNIFICANT CHANGE UP (ref 2–14)
NEUTROPHILS # BLD AUTO: 11.65 K/UL — HIGH (ref 1.8–7.4)
NEUTROPHILS NFR BLD AUTO: 78.3 % — HIGH (ref 43–77)
PLATELET # BLD AUTO: 248 K/UL — SIGNIFICANT CHANGE UP (ref 150–400)
POTASSIUM SERPL-MCNC: 3.7 MMOL/L — SIGNIFICANT CHANGE UP (ref 3.5–5.3)
POTASSIUM SERPL-SCNC: 3.7 MMOL/L — SIGNIFICANT CHANGE UP (ref 3.5–5.3)
RBC # BLD: 4.4 M/UL — SIGNIFICANT CHANGE UP (ref 4.2–5.8)
RBC # FLD: 12 % — SIGNIFICANT CHANGE UP (ref 10.3–14.5)
SODIUM SERPL-SCNC: 138 MMOL/L — SIGNIFICANT CHANGE UP (ref 135–145)
WBC # BLD: 14.91 K/UL — HIGH (ref 3.8–10.5)
WBC # FLD AUTO: 14.91 K/UL — HIGH (ref 3.8–10.5)

## 2024-02-01 PROCEDURE — 93005 ELECTROCARDIOGRAM TRACING: CPT

## 2024-02-01 PROCEDURE — 80048 BASIC METABOLIC PNL TOTAL CA: CPT

## 2024-02-01 PROCEDURE — 85025 COMPLETE CBC W/AUTO DIFF WBC: CPT

## 2024-02-01 PROCEDURE — 71045 X-RAY EXAM CHEST 1 VIEW: CPT

## 2024-02-01 PROCEDURE — 99285 EMERGENCY DEPT VISIT HI MDM: CPT | Mod: 25

## 2024-02-01 PROCEDURE — 36415 COLL VENOUS BLD VENIPUNCTURE: CPT

## 2024-02-01 PROCEDURE — 71045 X-RAY EXAM CHEST 1 VIEW: CPT | Mod: 26

## 2024-02-01 PROCEDURE — 93010 ELECTROCARDIOGRAM REPORT: CPT

## 2024-02-01 PROCEDURE — 99284 EMERGENCY DEPT VISIT MOD MDM: CPT

## 2024-02-01 RX ORDER — ALPRAZOLAM 0.25 MG
0.5 TABLET ORAL ONCE
Refills: 0 | Status: DISCONTINUED | OUTPATIENT
Start: 2024-02-01 | End: 2024-02-01

## 2024-02-01 RX ORDER — SODIUM CHLORIDE 9 MG/ML
1000 INJECTION INTRAMUSCULAR; INTRAVENOUS; SUBCUTANEOUS ONCE
Refills: 0 | Status: COMPLETED | OUTPATIENT
Start: 2024-02-01 | End: 2024-02-01

## 2024-02-01 RX ADMIN — Medication 0.5 MILLIGRAM(S): at 23:19

## 2024-02-01 RX ADMIN — SODIUM CHLORIDE 1000 MILLILITER(S): 9 INJECTION INTRAMUSCULAR; INTRAVENOUS; SUBCUTANEOUS at 22:03

## 2024-02-01 NOTE — ED ADULT NURSE NOTE - OBJECTIVE STATEMENT
PT A&OX4. Pt stated that he has been lightheaded intermittently for about 3 weeks. PT stated that he has a hx of HTN.

## 2024-02-01 NOTE — ED ADULT TRIAGE NOTE - CHIEF COMPLAINT QUOTE
BIBEMS with c/o lightheaded/dizziness x 1hour. multiple occurrences x 3 weeks. denies loc, fall, vision changes, weakness. hx HTN. A+O x3.

## 2024-02-01 NOTE — ED PROVIDER NOTE - ATTENDING CONTRIBUTION TO CARE
Kathy: I performed a face to face bedside interview with patient regarding history of present illness, review of symptoms and past medical history. I completed an independent physical exam.  I have discussed patient's plan of care with resident.   I agree with note as stated above including HISTORY OF PRESENT ILLNESS, HIV, PAST MEDICAL/SURGICAL/FAMILY/SOCIAL HISTORY, ALLERGIES AND HOME MEDICATIONS, REVIEW OF SYSTEMS, PHYSICAL EXAM, MEDICAL DECISION MAKING and any PROGRESS NOTES during the time I functioned as the attending physician for this patient unless otherwise noted. My brief assessment is as follows: 63/M, pmhx of HTN- on carvedilol, amlodipine, fosinopril, HCTZ, generalized anxiety disorder, alcohol use disorder was BIBEMS for episodes of light-headedness. Well appearing, orthostatic negative, benign neurologic exam. Pt on cardiac monitor, EKG with NSR, no ST-T changes. Basic blood work and chest x-ray ordered. S/p IVF.    On reassessment patient remains asymptomatic. Mildly dehydrated on labs. CXR WNL. Will follow with cardiology, Return precautions given.

## 2024-02-01 NOTE — ED PROVIDER NOTE - NSFOLLOWUPCLINICS_GEN_ALL_ED_FT
[facility]  Cardiology  [address] [place] [address]  Phone: [phone]  Fax:

## 2024-02-01 NOTE — ED PROVIDER NOTE - OBJECTIVE STATEMENT
63/M, pmhx of HTN- on carvedilol, amlodipine, fosinopril, HCTZ, generalized anxiety disorder, alcohol use disorder was BIBEMS for episodes of light-headedness. Pt reports having episodes of light-headedness which started a month ago, in the first episode he fell off the toilet hitting his face. Second episode was 3 weeks ago where he started experiencing lightheadedness and diaphoresis at dinner which lasted for a few minutes and spontaneously resolved. Experienced similar episode today at dinner time, where he felt lightheadedness, diaphoresis. Lightheadedness has resolved, pt reports feeling jittery. Reports he feels light headed when he changes position. Denies chest pain, SOB, palpitations, focal neurological deficit fall or loss of consciousness.

## 2024-02-01 NOTE — ED PROVIDER NOTE - NSFOLLOWUPINSTRUCTIONS_ED_ALL_ED_FT
- Follow up with your doctor within 2-3 days.   - Follow up with Cardiology, see information above.   - Bring results with you to the appointment.   - Return to the ED for any new or worsening symptoms.     Dizziness    Dizziness can manifest as a feeling of unsteadiness or light-headedness. You may feel like you are about to faint. This condition can be caused by a number of things, including medicines, dehydration, or illness. Drink enough fluid to keep your urine clear or pale yellow. Do not drink alcohol and limit your caffeine intake. Avoid quick or sudden movements.  Rise slowly from chairs and steady yourself until you feel okay. In the morning, first sit up on the side of the bed.    SEEK IMMEDIATE MEDICAL CARE IF YOU HAVE ANY OF THE FOLLOWING SYMPTOMS: vomiting, changes in your vision or speech, weakness in your arms or legs, trouble speaking or swallowing, chest pain, abdominal pain, shortness of breath, sweating, bleeding, headache, neck pain, or fever.

## 2024-02-01 NOTE — ED PROVIDER NOTE - PATIENT PORTAL LINK FT
You can access the FollowMyHealth Patient Portal offered by [facility] by registering at the following website: [email or web address]. By joining [employer]’s FollowMyHealth portal, you will also be able to view your health information using other applications (apps) compatible with our system.

## 2024-02-01 NOTE — ED PROVIDER NOTE - PHYSICAL EXAMINATION
Const: Awake, alert and oriented. In no acute distress.   HEENT: Moist mucous membranes.  Eyes: No scleral icterus  Neck:. Soft and supple  Cardiac: Regular rate and regular rhythm. +S1/S2. Peripheral pulses 2+ and symmetric. No LE edema.  Resp: Speaking in full sentences. No evidence of respiratory distress.  Abd: Soft, non-tender, non-distended. Normal bowel sounds in all 4 quadrants. No guarding or rebound.  Back: Spine midline and non-tender. No CVAT.  Skin: No rashes, abrasions or lacerations.  Neuro: Awake, alert & oriented x 3. Moves all extremities symmetrically.

## 2024-02-01 NOTE — ED PROVIDER NOTE - CLINICAL SUMMARY MEDICAL DECISION MAKING FREE TEXT BOX
63/M, pmhx of HTN- on carvedilol, amlodipine, fosinopril, HCTZ, generalized anxiety disorder, alcohol use disorder was BIBEMS for episodes of light-headedness. To r/o cardiac etiology of syncope, by history and physical exam orthostatic hypotension likely. Orthostatic vitals ordered. Pt on cardiac monitor, EKG with NSR, no ST-T changes. Basic blood work and chest x-ray ordered. S/p IVF. 63/M, pmhx of HTN- on carvedilol, amlodipine, fosinopril, HCTZ, generalized anxiety disorder, alcohol use disorder was BIBEMS for episodes of light-headedness. Well appearing, orthostatic negative, benign neurologic exam. Pt on cardiac monitor, EKG with NSR, no ST-T changes. Basic blood work and chest x-ray ordered. S/p IVF.    On reassessment patient remains asymptomatic. Mildly dehydrated on labs. CXR WNL. Will follow with cardiology, Return precautions given.

## 2024-02-02 VITALS
OXYGEN SATURATION: 98 % | TEMPERATURE: 98 F | RESPIRATION RATE: 18 BRPM | DIASTOLIC BLOOD PRESSURE: 85 MMHG | SYSTOLIC BLOOD PRESSURE: 138 MMHG | HEART RATE: 82 BPM

## 2024-02-11 ENCOUNTER — EMERGENCY (EMERGENCY)
Facility: HOSPITAL | Age: 64
LOS: 1 days | Discharge: HOME | End: 2024-02-11
Attending: EMERGENCY MEDICINE
Payer: COMMERCIAL

## 2024-02-11 VITALS
RESPIRATION RATE: 20 BRPM | OXYGEN SATURATION: 98 % | SYSTOLIC BLOOD PRESSURE: 106 MMHG | HEART RATE: 88 BPM | TEMPERATURE: 98 F | DIASTOLIC BLOOD PRESSURE: 73 MMHG | WEIGHT: 194.01 LBS

## 2024-02-11 PROBLEM — F41.9 ANXIETY DISORDER, UNSPECIFIED: Chronic | Status: ACTIVE | Noted: 2024-02-01

## 2024-02-11 LAB
ALBUMIN SERPL ELPH-MCNC: 4.1 G/DL — SIGNIFICANT CHANGE UP (ref 3.3–5.2)
ALP SERPL-CCNC: 81 U/L — SIGNIFICANT CHANGE UP (ref 40–120)
ALT FLD-CCNC: 40 U/L — SIGNIFICANT CHANGE UP
AMPHET UR-MCNC: NEGATIVE — SIGNIFICANT CHANGE UP
ANION GAP SERPL CALC-SCNC: 13 MMOL/L — SIGNIFICANT CHANGE UP (ref 5–17)
APAP SERPL-MCNC: <3 UG/ML — LOW (ref 10–26)
APPEARANCE UR: ABNORMAL
AST SERPL-CCNC: 24 U/L — SIGNIFICANT CHANGE UP
BACTERIA # UR AUTO: NEGATIVE /HPF — SIGNIFICANT CHANGE UP
BARBITURATES UR SCN-MCNC: NEGATIVE — SIGNIFICANT CHANGE UP
BASOPHILS # BLD AUTO: 0.02 K/UL — SIGNIFICANT CHANGE UP (ref 0–0.2)
BASOPHILS NFR BLD AUTO: 0.2 % — SIGNIFICANT CHANGE UP (ref 0–2)
BENZODIAZ UR-MCNC: NEGATIVE — SIGNIFICANT CHANGE UP
BILIRUB SERPL-MCNC: 1.9 MG/DL — SIGNIFICANT CHANGE UP (ref 0.4–2)
BILIRUB UR-MCNC: NEGATIVE — SIGNIFICANT CHANGE UP
BUN SERPL-MCNC: 19.8 MG/DL — SIGNIFICANT CHANGE UP (ref 8–20)
CALCIUM SERPL-MCNC: 9.2 MG/DL — SIGNIFICANT CHANGE UP (ref 8.4–10.5)
CAST: 5 /LPF — HIGH (ref 0–4)
CHLORIDE SERPL-SCNC: 104 MMOL/L — SIGNIFICANT CHANGE UP (ref 96–108)
CO2 SERPL-SCNC: 25 MMOL/L — SIGNIFICANT CHANGE UP (ref 22–29)
COCAINE METAB.OTHER UR-MCNC: NEGATIVE — SIGNIFICANT CHANGE UP
COLOR SPEC: YELLOW — SIGNIFICANT CHANGE UP
CREAT SERPL-MCNC: 1.27 MG/DL — SIGNIFICANT CHANGE UP (ref 0.5–1.3)
DIFF PNL FLD: NEGATIVE — SIGNIFICANT CHANGE UP
EGFR: 63 ML/MIN/1.73M2 — SIGNIFICANT CHANGE UP
EOSINOPHIL # BLD AUTO: 0.08 K/UL — SIGNIFICANT CHANGE UP (ref 0–0.5)
EOSINOPHIL NFR BLD AUTO: 0.7 % — SIGNIFICANT CHANGE UP (ref 0–6)
ETHANOL SERPL-MCNC: <10 MG/DL — SIGNIFICANT CHANGE UP (ref 0–9)
GLUCOSE SERPL-MCNC: 105 MG/DL — HIGH (ref 70–99)
GLUCOSE UR QL: NEGATIVE MG/DL — SIGNIFICANT CHANGE UP
HCT VFR BLD CALC: 43.6 % — SIGNIFICANT CHANGE UP (ref 39–50)
HGB BLD-MCNC: 14.7 G/DL — SIGNIFICANT CHANGE UP (ref 13–17)
IMM GRANULOCYTES NFR BLD AUTO: 0.5 % — SIGNIFICANT CHANGE UP (ref 0–0.9)
KETONES UR-MCNC: 40 MG/DL
LEUKOCYTE ESTERASE UR-ACNC: ABNORMAL
LYMPHOCYTES # BLD AUTO: 18.1 % — SIGNIFICANT CHANGE UP (ref 13–44)
LYMPHOCYTES # BLD AUTO: 2.15 K/UL — SIGNIFICANT CHANGE UP (ref 1–3.3)
MCHC RBC-ENTMCNC: 30.6 PG — SIGNIFICANT CHANGE UP (ref 27–34)
MCHC RBC-ENTMCNC: 33.7 GM/DL — SIGNIFICANT CHANGE UP (ref 32–36)
MCV RBC AUTO: 90.6 FL — SIGNIFICANT CHANGE UP (ref 80–100)
METHADONE UR-MCNC: NEGATIVE — SIGNIFICANT CHANGE UP
MONOCYTES # BLD AUTO: 0.73 K/UL — SIGNIFICANT CHANGE UP (ref 0–0.9)
MONOCYTES NFR BLD AUTO: 6.1 % — SIGNIFICANT CHANGE UP (ref 2–14)
NEUTROPHILS # BLD AUTO: 8.86 K/UL — HIGH (ref 1.8–7.4)
NEUTROPHILS NFR BLD AUTO: 74.4 % — SIGNIFICANT CHANGE UP (ref 43–77)
NITRITE UR-MCNC: NEGATIVE — SIGNIFICANT CHANGE UP
OPIATES UR-MCNC: NEGATIVE — SIGNIFICANT CHANGE UP
PCP SPEC-MCNC: SIGNIFICANT CHANGE UP
PCP UR-MCNC: NEGATIVE — SIGNIFICANT CHANGE UP
PH UR: 6 — SIGNIFICANT CHANGE UP (ref 5–8)
PLATELET # BLD AUTO: 256 K/UL — SIGNIFICANT CHANGE UP (ref 150–400)
POTASSIUM SERPL-MCNC: 3.8 MMOL/L — SIGNIFICANT CHANGE UP (ref 3.5–5.3)
POTASSIUM SERPL-SCNC: 3.8 MMOL/L — SIGNIFICANT CHANGE UP (ref 3.5–5.3)
PROT SERPL-MCNC: 6.9 G/DL — SIGNIFICANT CHANGE UP (ref 6.6–8.7)
PROT UR-MCNC: 30 MG/DL
RBC # BLD: 4.81 M/UL — SIGNIFICANT CHANGE UP (ref 4.2–5.8)
RBC # FLD: 12.2 % — SIGNIFICANT CHANGE UP (ref 10.3–14.5)
RBC CASTS # UR COMP ASSIST: 2 /HPF — SIGNIFICANT CHANGE UP (ref 0–4)
SALICYLATES SERPL-MCNC: <0.6 MG/DL — LOW (ref 10–20)
SODIUM SERPL-SCNC: 142 MMOL/L — SIGNIFICANT CHANGE UP (ref 135–145)
SP GR SPEC: 1.03 — SIGNIFICANT CHANGE UP (ref 1–1.03)
SQUAMOUS # UR AUTO: 2 /HPF — SIGNIFICANT CHANGE UP (ref 0–5)
T3 SERPL-MCNC: 111 NG/DL — SIGNIFICANT CHANGE UP (ref 80–200)
T4 AB SER-ACNC: 7.8 UG/DL — SIGNIFICANT CHANGE UP (ref 4.5–12)
T4 FREE SERPL-MCNC: 1.3 NG/DL — SIGNIFICANT CHANGE UP (ref 0.9–1.8)
THC UR QL: NEGATIVE — SIGNIFICANT CHANGE UP
TSH SERPL-MCNC: 1.17 UIU/ML — SIGNIFICANT CHANGE UP (ref 0.27–4.2)
UROBILINOGEN FLD QL: 1 MG/DL — SIGNIFICANT CHANGE UP (ref 0.2–1)
WBC # BLD: 11.9 K/UL — HIGH (ref 3.8–10.5)
WBC # FLD AUTO: 11.9 K/UL — HIGH (ref 3.8–10.5)
WBC UR QL: 16 /HPF — HIGH (ref 0–5)

## 2024-02-11 PROCEDURE — 99223 1ST HOSP IP/OBS HIGH 75: CPT

## 2024-02-11 PROCEDURE — 90792 PSYCH DIAG EVAL W/MED SRVCS: CPT

## 2024-02-11 RX ORDER — CARVEDILOL PHOSPHATE 80 MG/1
12.5 CAPSULE, EXTENDED RELEASE ORAL EVERY 12 HOURS
Refills: 0 | Status: DISCONTINUED | OUTPATIENT
Start: 2024-02-11 | End: 2024-02-18

## 2024-02-11 RX ORDER — AMLODIPINE BESYLATE 2.5 MG/1
10 TABLET ORAL ONCE
Refills: 0 | Status: COMPLETED | OUTPATIENT
Start: 2024-02-11 | End: 2024-02-11

## 2024-02-11 RX ORDER — LISINOPRIL 2.5 MG/1
40 TABLET ORAL DAILY
Refills: 0 | Status: DISCONTINUED | OUTPATIENT
Start: 2024-02-11 | End: 2024-02-18

## 2024-02-11 RX ORDER — HYDROXYZINE HCL 10 MG
25 TABLET ORAL EVERY 6 HOURS
Refills: 0 | Status: DISCONTINUED | OUTPATIENT
Start: 2024-02-11 | End: 2024-02-18

## 2024-02-11 RX ADMIN — Medication 25 MILLIGRAM(S): at 21:05

## 2024-02-11 RX ADMIN — Medication 10 MILLIGRAM(S): at 21:05

## 2024-02-11 RX ADMIN — Medication 2 MILLIGRAM(S): at 13:07

## 2024-02-11 NOTE — ED PROVIDER NOTE - NS ED ROS FT
Const: Denies fever, chills  HEENT: Denies blurry vision, sore throat  Neck: Denies neck pain/stiffness  Resp: Denies coughing, SOB  Cardiovascular: Denies CP, palpitations, LE edema  GI: Denies nausea, vomiting, abdominal pain, diarrhea, constipation, blood in stool  : Denies urinary frequency/urgency/dysuria, hematuria  MSK: Denies back pain  Neuro: Denies HA, dizziness, numbness, weakness  Skin: Denies rashes.  Psych: + Anxiety, Denies SI/HI/AVH

## 2024-02-11 NOTE — ED ADULT NURSE NOTE - OBJECTIVE STATEMENT
Pt c/o anxiety. States "I am taking new medication and am having anxiety about the stated side effects of SI/HI". Pt denies active thoughts of SI/HI. Pt has h/x of anxiety. Pt states his last drink was 30 days ago. States he used to drink every day and would have 6-7 at a time. Denies fevers, chills, cp, SOB, N/V/D.

## 2024-02-11 NOTE — ED PROVIDER NOTE - NSFOLLOWUPINSTRUCTIONS_ED_ALL_ED_FT
Anxiety    Generalized anxiety disorder (ISELA) is a mental disorder. It is defined as anxiety that is not necessarily related to specific events or activities or is out of proportion to said events. Symptoms include restlessness, fatigue, difficulty concentrations, irritability and difficulty concentrating. It may interfere with life functions, including relationships, work, and school. If you were started on a medication, make sure to take exactly as prescribed and follow up with a psychiatrist.    SEEK IMMEDIATE MEDICAL CARE IF YOU HAVE ANY OF THE FOLLOWING SYMPTOMS: thoughts about hurting killing yourself, thoughts about hurting or killing somebody else, hallucinations, or worsening depression.

## 2024-02-11 NOTE — ED BEHAVIORAL HEALTH ASSESSMENT NOTE - HPI (INCLUDE ILLNESS QUALITY, SEVERITY, DURATION, TIMING, CONTEXT, MODIFYING FACTORS, ASSOCIATED SIGNS AND SYMPTOMS)
62 yo WMM, lives with wife, employed , now off, no formal PPH, tx, psych admission or suicide attempt, h/o alcohol use disorder 6 drinks/day, stopped one month ago, denies h/o DTs or complicated withdrawal, PMH HTN bib wife for severe anxiety.  Pt reports having thoughts of "suicide".  Unpon exploration denies he is having SI as he is afraid to die.  He reports he looked up side effects of Buspar which revealed possible SI and so Pt is admittedly obsessing about "suicide.  He denies feeling depressed, sad or thoughts about ending his life.  Pt reports he cannot tolerate the anxiety and needs his meds adjusted.  Pt is treated with Paxil 30 mg for many years by PCP who added Buspar 15 mg tid approx 4 weeks ago.  Since starting Buspar Pt has become more anxious, obsessive thinking, rumination and no relief and PCP then increased to 30 mg tid?  Denies AH/VH/delusions.  Wife reports she is not taking Pt home this way due to panic attacks x 2 and one episode of being found on bathroom floor 2 weeks ago.  Pt also reports periods of diaphoresis when out with friends.  [facility] report they have an appt with a psychologist on Tuesday and wife is looking into if they have a prescriber there.  Wife has no concerns about SIB but does not want to take him home due to panic attacks  Pt to be held overnight for reassessment.

## 2024-02-11 NOTE — ED PROVIDER NOTE - PHYSICAL EXAMINATION
Const: Awake, alert and oriented. In no acute distress. Well appearing.  HEENT: NC/AT. Moist mucous membranes.  Eyes: No scleral icterus. EOMI.  Neck:. Soft and supple. Full ROM without pain.  Cardiac: Regular rate and regular rhythm. +S1/S2. No murmurs. Peripheral pulses 2+ and symmetric. No LE edema.  Resp: Speaking in full sentences. No evidence of respiratory distress. No wheezes, rales or rhonchi.  Abd: Soft, non-tender, non-distended. Normal bowel sounds in all 4 quadrants. No guarding or rebound.  Back: Spine midline and non-tender. No CVAT.  Skin: No rashes, abrasions or lacerations.  Neuro: Awake, alert & oriented x 3. Moves all extremities symmetrically.   Psych: + Very anxious appearing. Appropriate.

## 2024-02-11 NOTE — ED PROVIDER NOTE - PROGRESS NOTE DETAILS
Quiana: Patient seen by psych, is agreeable to stay overnight for observation and medication adjustment. Home medications ordered, 1 dose of 2 mg ativan now with 1 mg prn q8 to be alternated with 25 mg of hydroxyzine prn as well.

## 2024-02-11 NOTE — ED BEHAVIORAL HEALTH ASSESSMENT NOTE - DESCRIPTION
, employed HTN T(C): 36.6 (02-11-24 @ 19:15), Max: 36.8 (02-11-24 @ 11:22)  T(F): 97.8 (02-11-24 @ 19:15), Max: 98.2 (02-11-24 @ 11:22)  HR: 73 (02-11-24 @ 19:15) (73 - 90)  BP: 123/82 (02-11-24 @ 19:15) (106/73 - 123/82)  RR: 20 (02-11-24 @ 19:15) (20 - 20)  SpO2: 95% (02-11-24 @ 19:15) (95% - 98%)

## 2024-02-11 NOTE — ED CDU PROVIDER INITIAL DAY NOTE - CLINICAL SUMMARY MEDICAL DECISION MAKING FREE TEXT BOX
62 y/o M with anxiety presented for worsening anxiety since he stopped drinking alcohol 1 month ago, currently not well controlled on his current anxiety meds, does not yet follow with outpatient psych. Evaluated by psych in the ED, willing to stay for observation and medication adjustment and re-evaluation by psych tomorrow.

## 2024-02-11 NOTE — ED BEHAVIORAL HEALTH ASSESSMENT NOTE - CURRENT MEDICATION
busPIRone 10 milliGRAM(s) Oral three times a day  carvedilol 12.5 milliGRAM(s) Oral every 12 hours  lisinopril 40 milliGRAM(s) Oral daily  PARoxetine 30 milliGRAM(s) Oral daily

## 2024-02-11 NOTE — ED BEHAVIORAL HEALTH ASSESSMENT NOTE - SUMMARY
62 yo WMM, lives with wife, employed , now off, no formal PPH, tx, psych admission or suicide attempt, h/o alcohol use disorder 6 drinks/day, stopped one month ago, denies h/o DTs or complicated withdrawal, PMH HTN bib wife for severe anxiety.  Pt reports having thoughts of "suicide".  Upon exploration denies he is having SI as he is afraid to die.  He reports he looked up side effects of Buspar which revealed possible SI and so Pt is admittedly obsessing about "suicide".  He denies feeling depressed, sad or thoughts about ending his life.  Pt reports he cannot tolerate the anxiety and needs his meds adjusted.   Pt offered referral to [facility] however report they have an appt with a psychologist on Tuesday and wife is looking into if they have a prescriber there.  Wife has no concerns about SIB but does not want to take him home due to panic attacks.  Pt to be held overnight for reassessment.  Please give one dose Ativan 2 mg alternating with Hydroxyzine 25 mg 6 and Ativan 1 mg q6 until am reassessment.  Discussed admission to Rehab but Pt is not sure.

## 2024-02-11 NOTE — ED BEHAVIORAL HEALTH ASSESSMENT NOTE - DETAILS
Reports thoughts about "suicide" after reading warning labels from [employer] about SI. Dr Burns na

## 2024-02-11 NOTE — ED PROVIDER NOTE - OBJECTIVE STATEMENT
63-year-old male with past medical history hypertension, general anxiety disorder (on Paxil and buspirone) who stopped drinking alcohol 1 month ago presents for worsening anxiety.  Patient states that he was reading about some of his anxiety medications and that it can cause increased suicidal thoughts and now he cannot stop thinking about being suicidal.  He states "I prefer not to kill myself", and has no active plan.  He states he is having difficulty functioning because of these pervasive thoughts, and is unsure how he can calm himself down.  He was here on 2/1 for lightheadedness, was extensively worked up and medically cleared, but states he felt much better when they gave him 0.5 mg of Xanax, and is asking for that again.  He has not yet seen a psychiatrist, but does have an outpatient appointment in 2 days with a psychologist for the first time.  He has been compliant on his regular medications, denies smoking, denies illicit drugs.  He denies allergies to medications.

## 2024-02-11 NOTE — ED BEHAVIORAL HEALTH ASSESSMENT NOTE - RISK ASSESSMENT
RF anxiety attacks, abrupt stoppage of alcohol one mo ago, rumination, alcohol use disorder in one mo remission.  PF does not want to die, seeking rx for Benzo, limited insight

## 2024-02-11 NOTE — ED PROVIDER NOTE - CLINICAL SUMMARY MEDICAL DECISION MAKING FREE TEXT BOX
64 y/o M with generalized anxiety disorder presents for worsening anxiety after stopping drinking 1 month ago. No SI or HI or AVH. Patient has been extensively worked up and medically cleared. I discussed with psych who will evaluate for outpatient referral for [facility].

## 2024-02-11 NOTE — ED CDU PROVIDER INITIAL DAY NOTE - DETAILS
Patient with anxiety disorder presented for worsening anxiety that is interfering with his ADL's. Evaluated by psych, agreeable to stay overnight for medication adjustment.

## 2024-02-11 NOTE — ED ADULT TRIAGE NOTE - CHIEF COMPLAINT QUOTE
Patient presents to ER C/O anxiety, patient currently on Paxil and Buspirone, reports increasing anxiety, denies SI/HI, no alcohol/illicit drugs in the last 24 hrs, patient anxious, A&OX4, resp even/unlabored.

## 2024-02-12 VITALS
TEMPERATURE: 98 F | HEART RATE: 75 BPM | SYSTOLIC BLOOD PRESSURE: 132 MMHG | OXYGEN SATURATION: 97 % | DIASTOLIC BLOOD PRESSURE: 88 MMHG | RESPIRATION RATE: 18 BRPM

## 2024-02-12 PROCEDURE — 80053 COMPREHEN METABOLIC PANEL: CPT

## 2024-02-12 PROCEDURE — 81001 URINALYSIS AUTO W/SCOPE: CPT

## 2024-02-12 PROCEDURE — 99238 HOSP IP/OBS DSCHRG MGMT 30/<: CPT

## 2024-02-12 PROCEDURE — 84436 ASSAY OF TOTAL THYROXINE: CPT

## 2024-02-12 PROCEDURE — 84439 ASSAY OF FREE THYROXINE: CPT

## 2024-02-12 PROCEDURE — 90792 PSYCH DIAG EVAL W/MED SRVCS: CPT

## 2024-02-12 PROCEDURE — 36415 COLL VENOUS BLD VENIPUNCTURE: CPT

## 2024-02-12 PROCEDURE — 80307 DRUG TEST PRSMV CHEM ANLYZR: CPT

## 2024-02-12 PROCEDURE — G0378: CPT

## 2024-02-12 PROCEDURE — 85025 COMPLETE CBC W/AUTO DIFF WBC: CPT

## 2024-02-12 PROCEDURE — 84480 ASSAY TRIIODOTHYRONINE (T3): CPT

## 2024-02-12 PROCEDURE — 99282 EMERGENCY DEPT VISIT SF MDM: CPT | Mod: 25

## 2024-02-12 PROCEDURE — 84443 ASSAY THYROID STIM HORMONE: CPT

## 2024-02-12 RX ADMIN — LISINOPRIL 40 MILLIGRAM(S): 2.5 TABLET ORAL at 07:54

## 2024-02-12 RX ADMIN — Medication 10 MILLIGRAM(S): at 07:54

## 2024-02-12 RX ADMIN — CARVEDILOL PHOSPHATE 12.5 MILLIGRAM(S): 80 CAPSULE, EXTENDED RELEASE ORAL at 07:54

## 2024-02-12 RX ADMIN — Medication 30 MILLIGRAM(S): at 12:45

## 2024-02-12 RX ADMIN — Medication 10 MILLIGRAM(S): at 15:15

## 2024-02-12 NOTE — ED BEHAVIORAL HEALTH PROGRESS NOTE - PRN MEDS RECIEVED IN ED DETAILS FREE TEXT
MEDICATIONS  (PRN):  hydrOXYzine hydrochloride 25 milliGRAM(s) Oral every 6 hours PRN Anxiety  LORazepam     Tablet 1 milliGRAM(s) Oral every 8 hours PRN Anxiety

## 2024-02-12 NOTE — ED BEHAVIORAL HEALTH PROGRESS NOTE - SUMMARY
64 yo WMM, lives with wife, employed , now off, no formal PPH, tx, psych admission or suicide attempt, h/o alcohol use disorder 6 drinks/day, stopped one month ago, denies h/o DTs or complicated withdrawal, PMH HTN bib wife for severe anxiety.  Pt reports having thoughts of "suicide".  Upon exploration denies he is having SI as he is afraid to die.  He reports he looked up side effects of Buspar which revealed possible SI and so Pt is admittedly obsessing about "suicide".  He denies feeling depressed, sad or thoughts about ending his life.  Pt reports he cannot tolerate the anxiety and needs his meds adjusted.   Pt offered referral to [facility] however report they have an appt with a psychologist on Tuesday and wife is looking into if they have a prescriber there.  Wife has no concerns about SIB but does not want to take him home due to panic attacks.  Pt to be held overnight for reassessment.  Please give one dose Ativan 2 mg alternating with Hydroxyzine 25 mg 6 and Ativan 1 mg q6 until am reassessment.  Discussed admission to Rehab but Pt is not sure.

## 2024-02-12 NOTE — ED CDU PROVIDER DISPOSITION NOTE - PATIENT PORTAL LINK FT
You can access the FollowMyHealth Patient Portal offered by [facility] by registering at the following website: [email or web address]. By joining [employer]’s FollowMyHealth portal, you will also be able to view your health information using other applications (apps) compatible with our system.

## 2024-02-12 NOTE — CHART NOTE - NSCHARTNOTEFT_GEN_A_CORE
SW Note: Pt cleared by psychiatry with a referral for MH counseling recommended. Pt also seen by [facility] for ETOH outreach. Met with pt along with spouse. Discussed referral to [facility]. Pt accepted intake appt for 2/14 @ 2:30. Phone# for intake acll [phone]. HIPAA signed. Appt card and flyer provided along with info on their DASH center and mobile crisis line.

## 2024-02-12 NOTE — ED CDU PROVIDER DISPOSITION NOTE - CLINICAL COURSE
Patient initially presented for anxiety, more since he stopped drinking 1 month ago, had his Wellbutrin increased (a relatively new medication last month), was medically cleared, evaluated by psych, was held over night and reassessed.  Was given outpatient resources for [facility] as well as outpatient alcohol rehab, no medication changes.

## 2024-02-12 NOTE — ED BEHAVIORAL HEALTH PROGRESS NOTE - STANDING MEDS RECEIVED IN ED DETAILS FREE TEXT
MEDICATIONS  (STANDING):  busPIRone 10 milliGRAM(s) Oral three times a day  carvedilol 12.5 milliGRAM(s) Oral every 12 hours  lisinopril 40 milliGRAM(s) Oral daily  PARoxetine 30 milliGRAM(s) Oral daily

## 2024-02-12 NOTE — SBIRT NOTE ADULT - NSSBIRTALCPASSREFTXDET_GEN_A_CORE
Referral to Treatment attempted. Screening results were   reviewed with the patient and patient was provided information about healthy guidelines and potential negative   consequences associated with level of risk. Motivation and readiness to reduce or stop use was discussed and   goals and activities to make changes were suggested/offered

## 2024-02-12 NOTE — ED BEHAVIORAL HEALTH PROGRESS NOTE - CASE SUMMARY/FORMULATION (CLEARLY DOCUMENT RATIONALE FOR DISPOSITION CHANGE)
Calm cooperative engaged and pleasant throughout encounter. Appears comfortable, linear throughout encounter. Alert and oriented. Reports feeling better improved after receiving lorazepam yesterday. Denies any active, significant suicidal/homicidal ideations plan or intent. Denies any active, significant perceptual disturbances, no overt delusions elicited. Does not appear distressed dysphoric or overtly manic/psychotic. Denies any recent symptoms of significant alcohol or other substance intoxication/withdrawal. Future oriented, forward looking, able to cite reasons for continued living.  Patient denies any acute psychiatric issues concerns or complaints. No indication for admission to an acute inpatient psychiatric unit. Combined psychopharmacology and outpatient psychotherapy are the primary treatment modalities that are most likely to assist the patient in developing more productive means of managing his mental health conditions and navigating stressors/substance use, rather than an inpatient psychiatric admission.  Desires rehab resources, amenable with speaking with [facility].

## 2024-02-12 NOTE — ED BEHAVIORAL HEALTH PROGRESS NOTE - DETAILS:
Evaluated for follow up. Calm cooperative engaged and pleasant throughout encounter. Appears comfortable, linear throughout encounter. Alert and oriented. Reports feeling better improved after receiving lorazepam yesterday. Denies any active, significant suicidal/homicidal ideations plan or intent. Denies any active, significant perceptual disturbances, no overt delusions elicited. Does not appear distressed dysphoric or overtly manic/psychotic. Denies any recent symptoms of significant alcohol or other substance intoxication/withdrawal. Future oriented, forward looking, able to cite reasons for continued living.  Clarifies statements made previously. States that he stopped drinking alcohol several weeks to about a month ago, no cravings or withdrawal now. However, since cessation has been experiencing increased anxiety with intermittent panic attacks. Adamantly denies ever experiencing active thoughts of self harm. More so will experience transient passive thoughts of self harm particularly during a panic attack. Requests rehab resources, amenable with speaking with [facility]. Wife, josé miguel at bedside did not express acute safety concerns though also believes he would benefit from rehab resources.

## 2024-02-12 NOTE — ED BEHAVIORAL HEALTH PROGRESS NOTE - DETAILS
self referred  Advised patient to go to nearest ER or call 911, for any of the followin) agitation aggression or anxiety, 2) suicidal or homicidal thoughts 3) worsening of symptoms or 4) side effects of medications

## 2024-02-12 NOTE — ED BEHAVIORAL HEALTH PROGRESS NOTE - NSBHMSEJUDGE_PSY_A_CORE
Health Maintenance Due   Topic Date Due    COVID-19 Vaccine (1) Never done    Foot Exam  06/24/2020    Hemoglobin A1c  03/08/2022     Updates were requested from care everywhere.  Chart was reviewed for overdue Proactive [facility] Encounters (RACHEL) topics (CRS, Breast Cancer Screening, Eye exam)  Health Maintenance has been updated.  LINKS immunization registry triggered.  Immunizations were reconciled.     Fair

## 2024-02-12 NOTE — ED ADULT NURSE REASSESSMENT NOTE - NS ED NURSE REASSESS COMMENT FT1
Pt resting in bed. Resp even and unlabored. NAD. Psych at bedside.
Pt resting in bed. States his anxiety "feels better" after ativan. VSS. Resp even and unlabored. Currently under obs. NAD.
Pt. resting in bed awaiting DC. Pt. AxO3 with equal and unlabored respirations.
Pt. resting in bed with equal and unlabored respirations. Pt. AxO3 showing no signs of distress. SBIRT at bedside.
assumed care of this patient at 1920 hours. discussed all medical information with the off going nurse. This patient is resting comfortably in bed no apparent distress noted at this time. Air breathing circulation WNL. Denies pain. call light within reach. This nurse will continue to monitor.
report received from night SADIE Box. Pt. resting in bed showing no signs of distress at this time. Respirations equal and nonlabored. Pt. awaiting psych consult and educated on plan of care.

## 2024-02-16 ENCOUNTER — EMERGENCY (EMERGENCY)
Facility: HOSPITAL | Age: 64
LOS: 1 days | Discharge: OTHER INSTITUTION | End: 2024-02-16
Attending: EMERGENCY MEDICINE
Payer: COMMERCIAL

## 2024-02-16 ENCOUNTER — RESULT REVIEW (OUTPATIENT)
Age: 64
End: 2024-02-16

## 2024-02-16 VITALS
SYSTOLIC BLOOD PRESSURE: 114 MMHG | HEART RATE: 100 BPM | DIASTOLIC BLOOD PRESSURE: 85 MMHG | WEIGHT: 199.96 LBS | TEMPERATURE: 99 F | OXYGEN SATURATION: 95 % | RESPIRATION RATE: 18 BRPM | HEIGHT: 66 IN

## 2024-02-16 DIAGNOSIS — F33.2 MAJOR DEPRESSIVE DISORDER, RECURRENT SEVERE WITHOUT PSYCHOTIC FEATURES: ICD-10-CM

## 2024-02-16 DIAGNOSIS — F10.21 ALCOHOL DEPENDENCE, IN REMISSION: ICD-10-CM

## 2024-02-16 LAB
ALBUMIN SERPL ELPH-MCNC: 4.2 G/DL — SIGNIFICANT CHANGE UP (ref 3.3–5.2)
ALP SERPL-CCNC: 79 U/L — SIGNIFICANT CHANGE UP (ref 40–120)
ALT FLD-CCNC: 35 U/L — SIGNIFICANT CHANGE UP
AMPHET UR-MCNC: NEGATIVE — SIGNIFICANT CHANGE UP
ANION GAP SERPL CALC-SCNC: 15 MMOL/L — SIGNIFICANT CHANGE UP (ref 5–17)
APPEARANCE UR: ABNORMAL
AST SERPL-CCNC: 22 U/L — SIGNIFICANT CHANGE UP
BACTERIA # UR AUTO: NEGATIVE /HPF — SIGNIFICANT CHANGE UP
BARBITURATES UR SCN-MCNC: NEGATIVE — SIGNIFICANT CHANGE UP
BASOPHILS # BLD AUTO: 0.02 K/UL — SIGNIFICANT CHANGE UP (ref 0–0.2)
BASOPHILS NFR BLD AUTO: 0.2 % — SIGNIFICANT CHANGE UP (ref 0–2)
BENZODIAZ UR-MCNC: NEGATIVE — SIGNIFICANT CHANGE UP
BILIRUB SERPL-MCNC: 2 MG/DL — SIGNIFICANT CHANGE UP (ref 0.4–2)
BILIRUB UR-MCNC: ABNORMAL
BUN SERPL-MCNC: 15.3 MG/DL — SIGNIFICANT CHANGE UP (ref 8–20)
CALCIUM SERPL-MCNC: 9.5 MG/DL — SIGNIFICANT CHANGE UP (ref 8.4–10.5)
CAST: 2 /LPF — SIGNIFICANT CHANGE UP (ref 0–4)
CHLORIDE SERPL-SCNC: 104 MMOL/L — SIGNIFICANT CHANGE UP (ref 96–108)
CO2 SERPL-SCNC: 24 MMOL/L — SIGNIFICANT CHANGE UP (ref 22–29)
COCAINE METAB.OTHER UR-MCNC: NEGATIVE — SIGNIFICANT CHANGE UP
COLOR SPEC: SIGNIFICANT CHANGE UP
CREAT SERPL-MCNC: 1.17 MG/DL — SIGNIFICANT CHANGE UP (ref 0.5–1.3)
DIFF PNL FLD: NEGATIVE — SIGNIFICANT CHANGE UP
EGFR: 70 ML/MIN/1.73M2 — SIGNIFICANT CHANGE UP
EOSINOPHIL # BLD AUTO: 0.09 K/UL — SIGNIFICANT CHANGE UP (ref 0–0.5)
EOSINOPHIL NFR BLD AUTO: 0.9 % — SIGNIFICANT CHANGE UP (ref 0–6)
ETHANOL SERPL-MCNC: <10 MG/DL — SIGNIFICANT CHANGE UP (ref 0–9)
FLUAV AG NPH QL: SIGNIFICANT CHANGE UP
FLUBV AG NPH QL: SIGNIFICANT CHANGE UP
GLUCOSE SERPL-MCNC: 120 MG/DL — HIGH (ref 70–99)
GLUCOSE UR QL: NEGATIVE MG/DL — SIGNIFICANT CHANGE UP
HCT VFR BLD CALC: 43 % — SIGNIFICANT CHANGE UP (ref 39–50)
HGB BLD-MCNC: 14.9 G/DL — SIGNIFICANT CHANGE UP (ref 13–17)
IMM GRANULOCYTES NFR BLD AUTO: 0.5 % — SIGNIFICANT CHANGE UP (ref 0–0.9)
KETONES UR-MCNC: 40 MG/DL
LEUKOCYTE ESTERASE UR-ACNC: NEGATIVE — SIGNIFICANT CHANGE UP
LYMPHOCYTES # BLD AUTO: 1.97 K/UL — SIGNIFICANT CHANGE UP (ref 1–3.3)
LYMPHOCYTES # BLD AUTO: 18.7 % — SIGNIFICANT CHANGE UP (ref 13–44)
MCHC RBC-ENTMCNC: 31.1 PG — SIGNIFICANT CHANGE UP (ref 27–34)
MCHC RBC-ENTMCNC: 34.7 GM/DL — SIGNIFICANT CHANGE UP (ref 32–36)
MCV RBC AUTO: 89.8 FL — SIGNIFICANT CHANGE UP (ref 80–100)
METHADONE UR-MCNC: NEGATIVE — SIGNIFICANT CHANGE UP
MONOCYTES # BLD AUTO: 0.79 K/UL — SIGNIFICANT CHANGE UP (ref 0–0.9)
MONOCYTES NFR BLD AUTO: 7.5 % — SIGNIFICANT CHANGE UP (ref 2–14)
NEUTROPHILS # BLD AUTO: 7.59 K/UL — HIGH (ref 1.8–7.4)
NEUTROPHILS NFR BLD AUTO: 72.2 % — SIGNIFICANT CHANGE UP (ref 43–77)
NITRITE UR-MCNC: NEGATIVE — SIGNIFICANT CHANGE UP
OPIATES UR-MCNC: NEGATIVE — SIGNIFICANT CHANGE UP
PCP SPEC-MCNC: SIGNIFICANT CHANGE UP
PCP UR-MCNC: NEGATIVE — SIGNIFICANT CHANGE UP
PH UR: 5.5 — SIGNIFICANT CHANGE UP (ref 5–8)
PLATELET # BLD AUTO: 273 K/UL — SIGNIFICANT CHANGE UP (ref 150–400)
POTASSIUM SERPL-MCNC: 3.8 MMOL/L — SIGNIFICANT CHANGE UP (ref 3.5–5.3)
POTASSIUM SERPL-SCNC: 3.8 MMOL/L — SIGNIFICANT CHANGE UP (ref 3.5–5.3)
PROT SERPL-MCNC: 6.8 G/DL — SIGNIFICANT CHANGE UP (ref 6.6–8.7)
PROT UR-MCNC: 30 MG/DL
RBC # BLD: 4.79 M/UL — SIGNIFICANT CHANGE UP (ref 4.2–5.8)
RBC # FLD: 12.3 % — SIGNIFICANT CHANGE UP (ref 10.3–14.5)
RBC CASTS # UR COMP ASSIST: 6 /HPF — HIGH (ref 0–4)
RSV RNA NPH QL NAA+NON-PROBE: SIGNIFICANT CHANGE UP
SARS-COV-2 RNA SPEC QL NAA+PROBE: SIGNIFICANT CHANGE UP
SODIUM SERPL-SCNC: 143 MMOL/L — SIGNIFICANT CHANGE UP (ref 135–145)
SP GR SPEC: >1.03 — HIGH (ref 1–1.03)
SQUAMOUS # UR AUTO: 3 /HPF — SIGNIFICANT CHANGE UP (ref 0–5)
THC UR QL: NEGATIVE — SIGNIFICANT CHANGE UP
TROPONIN T, HIGH SENSITIVITY RESULT: 14 NG/L — SIGNIFICANT CHANGE UP (ref 0–51)
UROBILINOGEN FLD QL: 1 MG/DL — SIGNIFICANT CHANGE UP (ref 0.2–1)
WBC # BLD: 10.51 K/UL — HIGH (ref 3.8–10.5)
WBC # FLD AUTO: 10.51 K/UL — HIGH (ref 3.8–10.5)
WBC UR QL: 13 /HPF — HIGH (ref 0–5)

## 2024-02-16 PROCEDURE — 99223 1ST HOSP IP/OBS HIGH 75: CPT

## 2024-02-16 PROCEDURE — 99285 EMERGENCY DEPT VISIT HI MDM: CPT

## 2024-02-16 PROCEDURE — 80307 DRUG TEST PRSMV CHEM ANLYZR: CPT

## 2024-02-16 PROCEDURE — 99285 EMERGENCY DEPT VISIT HI MDM: CPT | Mod: 25

## 2024-02-16 PROCEDURE — 93306 TTE W/DOPPLER COMPLETE: CPT

## 2024-02-16 PROCEDURE — 80053 COMPREHEN METABOLIC PANEL: CPT

## 2024-02-16 PROCEDURE — 81001 URINALYSIS AUTO W/SCOPE: CPT

## 2024-02-16 PROCEDURE — 87637 SARSCOV2&INF A&B&RSV AMP PRB: CPT

## 2024-02-16 PROCEDURE — 93005 ELECTROCARDIOGRAM TRACING: CPT

## 2024-02-16 PROCEDURE — G0378: CPT

## 2024-02-16 PROCEDURE — 84484 ASSAY OF TROPONIN QUANT: CPT

## 2024-02-16 PROCEDURE — 93306 TTE W/DOPPLER COMPLETE: CPT | Mod: 26

## 2024-02-16 PROCEDURE — 36415 COLL VENOUS BLD VENIPUNCTURE: CPT

## 2024-02-16 PROCEDURE — 93010 ELECTROCARDIOGRAM REPORT: CPT

## 2024-02-16 PROCEDURE — 85025 COMPLETE CBC W/AUTO DIFF WBC: CPT

## 2024-02-16 RX ORDER — PANTOPRAZOLE SODIUM 20 MG/1
40 TABLET, DELAYED RELEASE ORAL ONCE
Refills: 0 | Status: COMPLETED | OUTPATIENT
Start: 2024-02-16 | End: 2024-02-16

## 2024-02-16 RX ORDER — SODIUM CHLORIDE 9 MG/ML
3 INJECTION INTRAMUSCULAR; INTRAVENOUS; SUBCUTANEOUS ONCE
Refills: 0 | Status: COMPLETED | OUTPATIENT
Start: 2024-02-16 | End: 2024-02-16

## 2024-02-16 RX ORDER — CARVEDILOL PHOSPHATE 80 MG/1
12.5 CAPSULE, EXTENDED RELEASE ORAL ONCE
Refills: 0 | Status: COMPLETED | OUTPATIENT
Start: 2024-02-16 | End: 2024-02-16

## 2024-02-16 RX ORDER — METOPROLOL TARTRATE 50 MG
50 TABLET ORAL ONCE
Refills: 0 | Status: COMPLETED | OUTPATIENT
Start: 2024-02-16 | End: 2024-02-16

## 2024-02-16 RX ORDER — METOPROLOL TARTRATE 50 MG
5 TABLET ORAL ONCE
Refills: 0 | Status: COMPLETED | OUTPATIENT
Start: 2024-02-16 | End: 2024-02-16

## 2024-02-16 RX ADMIN — Medication 50 MILLIGRAM(S): at 15:14

## 2024-02-16 RX ADMIN — SODIUM CHLORIDE 3 MILLILITER(S): 9 INJECTION INTRAMUSCULAR; INTRAVENOUS; SUBCUTANEOUS at 15:21

## 2024-02-16 RX ADMIN — Medication 1 MILLIGRAM(S): at 15:12

## 2024-02-16 RX ADMIN — PANTOPRAZOLE SODIUM 40 MILLIGRAM(S): 20 TABLET, DELAYED RELEASE ORAL at 21:02

## 2024-02-16 RX ADMIN — CARVEDILOL PHOSPHATE 12.5 MILLIGRAM(S): 80 CAPSULE, EXTENDED RELEASE ORAL at 21:02

## 2024-02-16 NOTE — ED BEHAVIORAL HEALTH ASSESSMENT NOTE - HPI (INCLUDE ILLNESS QUALITY, SEVERITY, DURATION, TIMING, CONTEXT, MODIFYING FACTORS, ASSOCIATED SIGNS AND SYMPTOMS)
presents for second time this week with severe anxiety and intensifying suicidal thoughts Wife at bedside very concerned as he verbalizes hopelessness and inability to function About 1 month ago self detox from 20 year h/o alcoholism - daily vodka drinking after what sounds like a syncopal episode at local restaurant reports taking 30 mg of Paxil for years (even when drinking) for anxiety/? panic symptoms- PCP dr Brando Delgado prescribed up to 60 mg daily of Buspar without relief Yesterday he saw Alyse Mchugh NP at [facility] who recommended naltrexone and hydroxyzine - He feels both medications made him more anxious and thinking seemed disturbing i.e suicidal urges  reviewed and discussed A Amadou's note from 2/11  Wife called [facility] earlier today to inquire about admission given 's level of desperation and fears for his safety  Was told last month about EKG changes and nuclear stress test was scheduled for 3/18

## 2024-02-16 NOTE — ED CDU PROVIDER INITIAL DAY NOTE - CLINICAL SUMMARY MEDICAL DECISION MAKING FREE TEXT BOX
Patient medically cleared for behavioral health at this time    patient to be reevaluated by psychiatry in the morning for final disposition

## 2024-02-16 NOTE — ED BEHAVIORAL HEALTH ASSESSMENT NOTE - RISK ASSESSMENT
patient at elevated risk of self injurious behaviors given extreme anxiety , insomnia, age, ethnicity and gender

## 2024-02-16 NOTE — ED PROVIDER NOTE - CARE PLAN
1 Principal Discharge DX:	Major depressive disorder, recurrent episode, severe  Secondary Diagnosis:	History of alcohol abuse

## 2024-02-16 NOTE — ED BEHAVIORAL HEALTH NOTE - BEHAVIORAL HEALTH NOTE
Chart reviewed, received signout, patient reassessed   - Seen by cardiology, medically cleared after negative troponin and echo   - received ativan 1mg PO at 1512, also metoprolol 50mg PO at 1514    On interview patient states that he feels better since receiving the ativan, with improvement in anxiety and mood. However he states that given how anxious he has been, and that he developed suicide ideation for the first time last night, and that he had tried staying out of the hospital earlier this week, he still feels he wants admission. He states that he has been on paxil for 30 years, maybe had tried prozac or other SSRI back then but doesn't remember, recently tried buspar without any effect, and last night took naltrexone for the first time.     MSE: age appearing male sitting in bed comfortably, no acute distress, not internally preoccupied. Speech prosodic, coherent, non pressured. Mood "a little better" affect congruent, full range, predominantly euthymic. No current suicide ideation, linear TP. AAO x 3, no auditory, visual, or tactile hallucinations or delusions    A/P: 63M, living w wife, works as , PMH of HTN, no formal psych hx, no SA or hosps, does have hx of etoh use disorder but stopped a month ago abruptly (had been drinking 4-6 drinks per day for many years, no hx of withdrawal), now BIB wife for sever anxiety, panic since quitting etoh, was here a few days ago, referred to outpatient, saw them yesterday, put on naltrexone, became more anxious, and now returned with suicide ideation.    - 9.13 admission pending covid result   - Continue coreg 12.5mg PO qday, metoprolol tartrate 50mg PO qDay, pantoprazole 40mg PO qDay   - Continue paxil 30mg PO qDay, PRN ativan, could try gabapentin 300mg PRN for anxiety

## 2024-02-16 NOTE — ED PROVIDER NOTE - NS ED ROS FT
Constitutional: no fever, no chills  Eyes: no pain, no redness, no visual changes.  ENMT: no ear pain, no hearing problems, no nasal congestion/drainage, no throat pain, no neck pain/stiffness  CV: no chest pain, no edema  Resp: no cough, no dyspnea  GI: no abdominal pain, no bloating, no constipation, no diarrhea, no nausea, no vomiting.  : no dysuria, no hematuria  MSK: no msk pain, no weakness  Skin: no lesions, and no rashes   Neuro: no LOC, no headache, no sensory deficits, and no weakness + anxiety

## 2024-02-16 NOTE — ED ADULT NURSE NOTE - OBJECTIVE STATEMENT
patient has increasing anxiety and si as claimed. patient alert and oriented x 4 Dressed in yellow gown patient was brought to  by nurse and security. Patient was medically cleared by main ED attending. Patient wand by security for contraband. Patient reported severe anxiety that interferes with his work and his daily activities,  he is looking for help.

## 2024-02-16 NOTE — ED CDU PROVIDER INITIAL DAY NOTE - OBJECTIVE STATEMENT
62 yo male   seen in ED within past week  Self detoxed from alcohol after approx 15-20 years. Presented for disabling anxiety, same as recent visit  Never did street drugs not a smoker  tapered off buspar after last visit Followed up with [facility] around [place]. RX naltrexone which gave him hallucinations and SI. He does not want to die and does nt  have a plan. His anxiety however is disabling and he cannot function.  Meds same as list 4 days ago except buspar is off list and only took one naltrexone pill

## 2024-02-16 NOTE — ED BEHAVIORAL HEALTH ASSESSMENT NOTE - DESCRIPTION
seen by Dr Estrada Due to EKG changes cardiac workup is ordered  Vital Signs Last 24 Hrs  T(C): 37 (16 Feb 2024 12:38), Max: 37 (16 Feb 2024 12:38)  T(F): 98.6 (16 Feb 2024 12:38), Max: 98.6 (16 Feb 2024 12:38)  HR: 74 (16 Feb 2024 15:24) (74 - 100)  BP: 128/77 (16 Feb 2024 15:24) (114/85 - 128/77)  BP(mean): --  RR: 18 (16 Feb 2024 15:24) (18 - 18)  SpO2: 96% (16 Feb 2024 15:24) (95% - 96%)    Parameters below as of 16 Feb 2024 12:38  Patient On (Oxygen Delivery Method): room air HTN , employed

## 2024-02-16 NOTE — ED ADULT NURSE NOTE - HPI (INCLUDE ILLNESS QUALITY, SEVERITY, DURATION, TIMING, CONTEXT, MODIFYING FACTORS, ASSOCIATED SIGNS AND SYMPTOMS)
Dressed in yellow gown patient was brought to  by nurse and security. Patient was medically cleared by main ED attending. Patient wand by security for contraband. Patient reported severe anxiety that interferes with his work and his daily activities,  he is looking for help.

## 2024-02-16 NOTE — ED ADULT NURSE NOTE - NS_SISCREENINGSR_GEN_ALL_ED
----- Message from Sangita Amaro sent at 3/16/2022 10:51 AM CDT -----  Regarding: returning call  Contact: Patient  Type:  Patient Returning Call    Who Called:  Patient   Who Left Message for Patient:  notsure  Does the patient know what this is regarding?:  no  Best Call Back Number:  [phone] (home)          Negative Positive

## 2024-02-16 NOTE — ED ADULT NURSE REASSESSMENT NOTE - NS ED NURSE REASSESS COMMENT FT1
Patient was seen by tele psych, plan is to transfer patient, - 9.13 admission.  Awaiting for Pending Covid result and a hospital bed.    - Continue coreg 12.5mg PO qday, metoprolol tartrate 50mg PO qDay, pantoprazole 40mg PO qDay   - Continue paxil 30mg PO qDay, PRN ativan, could try gabapentin 300mg PRN for anxiety.  Plan reviewed with patient, he is in agreement. No concerns for safety.

## 2024-02-16 NOTE — ED BEHAVIORAL HEALTH ASSESSMENT NOTE - PAST PSYCHOTROPIC MEDICATION
Buspar 30 mg tid by PCP 2 weeks ago  hydroxyzine 25 mg last night  naltrexone 50 mg one dose this week

## 2024-02-16 NOTE — ED ADULT NURSE NOTE - NS_NURSE_TRANSFERRED_ED_ALL_ED_DT
telepsych assessment completed.  [facility] and [facility] are full.  [facility] and [facility] full,  Call out to [facility] and they will contact the ER with a fax requesting more information.  [phone]  Fax [phone]. All final recommendations for care and disposition are made by emergency dept attending.     17-Feb-2024 12:08

## 2024-02-16 NOTE — ED PROVIDER NOTE - PHYSICAL EXAMINATION
General: well appearing, NAD  Head:  NC, AT  Eyes: EOMI, no scleral icterus  Cardiac: RRR, no apparent murmurs, no lower extremity edema  Respiratory: CTABL, equal chest wall expansions   MSK/Vascular: full ROM, soft compartments, warm extremities  Neuro: AAOx3, clear speech alert   Psych: calm, cooperative, normal affect

## 2024-02-16 NOTE — ED ADULT TRIAGE NOTE - CHIEF COMPLAINT QUOTE
pt c/o anxiety and sucidal thoughts, no plan since last night, was diagnosed with anxiety and was started on new medication that seemed to exacerbate things, denies alcohol use for the last month, denies drug use. never hospitalized.

## 2024-02-16 NOTE — ED BEHAVIORAL HEALTH ASSESSMENT NOTE - SUMMARY
64 year old male with long history of alcohol use disorder, long history of anxiety / panic presents with depressed mood, high anxiety, racing thoughts, hopelessness and now suicidal urges  Cardiac workup ordered  presuming medical clearance would advise inpatient psychiatric admission on voluntary application

## 2024-02-16 NOTE — ED PROVIDER NOTE - CLINICAL SUMMARY MEDICAL DECISION MAKING FREE TEXT BOX
Pt with severe anxiety unable to function. Also hx HTN.  Meds carvedidilol 12.5 mg BID, Lisinopril 40 mg QD, Paroxetine 30 mg QD, Hydroxyzine 25 mg prn anxiety, lorazepam 1 mg q8h prn anxiety  pe unremarkable  plan is EKG ua u tox labs covid swab BH eval

## 2024-02-16 NOTE — ED ADULT NURSE NOTE - NSFALLUNIVINTERV_ED_ALL_ED
Bed/Stretcher in lowest position, wheels locked, appropriate side rails in place/Call bell, personal items and telephone in reach/Instruct patient to call for assistance before getting out of bed/chair/stretcher/Non-slip footwear applied when patient is off stretcher/[place] to call system/Physically safe environment - no spills, clutter or unnecessary equipment/Purposeful proactive rounding/Room/bathroom lighting operational, light cord in reach

## 2024-02-16 NOTE — ED PROVIDER NOTE - SHIFT CHANGE DETAILS
Pt was going to BH but EKG shows new changes compared to 15 days ago  Must monitor and check trops, medically clear  before BH eval

## 2024-02-16 NOTE — ED BEHAVIORAL HEALTH ASSESSMENT NOTE - CURRENT MEDICATION
carvedilol 12.5 milliGRAM(s) Oral every 12 hours  lisinopril 40 milliGRAM(s) Oral daily  Paroxetine 30 milliGRAM(s) Oral daily

## 2024-02-16 NOTE — CONSULT NOTE ADULT - SUBJECTIVE AND OBJECTIVE BOX
formerly [facility]                              [address] [place] [address]                                                 PHONE: [phone]                                                 FAX: [phone]  ------------------------------------------------------------------------------------------------    Chief complaint:  suicidal thoughts and anxiety    63y Male with past medical history as under presented for disabling anxiety. Pt was going to  but EKG shows new changes compared to 15 days ago.  At the time of evaluation, pt reports feeling well.     PAST MEDICAL & SURGICAL HISTORY:  HTN (hypertension)      Anxiety      No significant past surgical history          No Known Allergies      Vital Signs Last 24 Hrs  T(C): 37 (16 Feb 2024 12:38), Max: 37 (16 Feb 2024 12:38)  T(F): 98.6 (16 Feb 2024 12:38), Max: 98.6 (16 Feb 2024 12:38)  HR: 74 (16 Feb 2024 15:24) (74 - 100)  BP: 128/77 (16 Feb 2024 15:24) (114/85 - 128/77)  BP(mean): --  RR: 18 (16 Feb 2024 15:24) (18 - 18)  SpO2: 96% (16 Feb 2024 15:24) (95% - 96%)    Parameters below as of 16 Feb 2024 12:38  Patient On (Oxygen Delivery Method): room air        RELEVANT PHYSICAL EXAM:  Neck: No obvious JVD  Cardiovascular: regular S1, S2  Respiratory: Lungs clear to auscultation; no crepitations, no wheeze  Musculoskeletal: No edema    LABS:                        14.9   10.51 )-----------( 273      ( 16 Feb 2024 13:43 )             43.0     02-16    143  |  104  |  15.3  ----------------------------<  120<H>  3.8   |  24.0  |  1.17    Ca    9.5      16 Feb 2024 13:43    TPro  6.8  /  Alb  4.2  /  TBili  2.0  /  DBili  x   /  AST  22  /  ALT  35  /  AlkPhos  79  02-16            RADIOLOGY & ADDITIONAL STUDIES: (reviewed)  CXR was independently visualized/reviewed and demonstrated: clear lungs    CARDIOLOGY TESTING:(reviewed)     ECG (Independent visualization):    ECHOCARDIOGRAM : NSR with T wave inversion in the lateral leads    TELEMETRY independently visualized/reviewed and demonstrated : NSR [60-80  ]  with no arrhythmias;     MEDICATIONS:(reviewed)  Home Medications:  Home Medications:      MEDICATIONS  (STANDING):    MEDICATIONS  (PRN):      ASSESSMENT AND PLAN:    63 y.o. male with a significant PMHx of hypertension, generalized anxiety disorder, alcohol use disorder noted to have abnormal ECG.    No symptoms at this time  Check troponin and Echo   If troponin negative and Echo unremarkable, no cardiac restriction to going to     Additional history obtained from wife   [independent historian] and plan of care discussed at bedside    Plan discussed with Dr. Robbins

## 2024-02-16 NOTE — ED PROVIDER NOTE - OBJECTIVE STATEMENT
64 yo male   seen in ED within past week  Self detoxed from alcohol after approx 15-20 years. Presented for disabling anxiety, same as recent visit  Never did street drugs not a smoker  tapered off buspar after last visit Followed up with [facility] around [place]. RX naltrexone which gave him hallucinations and SI. He does not want to die and does nt  have a plan. His anxiety however is disabling and he cannot function.  Meds same as list 4 days ago except buspar is off list and only took one naltrexone pill

## 2024-02-16 NOTE — ED PROVIDER NOTE - PROGRESS NOTE DETAILS
Pt and wife advised EKG had changes since Feb 1. Wife shared that pt saw cardio [facility] [place] Dr Lopez this a.m. who also noticed that the ekg was changed and set pt up for outpt echo and nuc stress. This information shared with Dr Robbins who is assuming care of pt. Ativan 1 mg po ordered for symptomatic relief.  aware that pt going to main for further evaluation and that D Ernestolaustein is taking over pt care Patient seen and cleared by cardiology pending troponin and echocardiogram which were both within normal limits at this time.  As per psych  will place on observation and BH and patient to be reevaluated in the a.m. by psychiatry.  Patient requesting his nighttime meds including Coreg 12.5 mg and PPI

## 2024-02-17 VITALS
DIASTOLIC BLOOD PRESSURE: 84 MMHG | HEART RATE: 85 BPM | TEMPERATURE: 98 F | RESPIRATION RATE: 18 BRPM | SYSTOLIC BLOOD PRESSURE: 138 MMHG | OXYGEN SATURATION: 95 %

## 2024-02-17 DIAGNOSIS — Z86.59 PERSONAL HISTORY OF OTHER MENTAL AND BEHAVIORAL DISORDERS: ICD-10-CM

## 2024-02-17 PROCEDURE — 99213 OFFICE O/P EST LOW 20 MIN: CPT

## 2024-02-17 PROCEDURE — 99233 SBSQ HOSP IP/OBS HIGH 50: CPT

## 2024-02-17 RX ORDER — LISINOPRIL 2.5 MG/1
40 TABLET ORAL DAILY
Refills: 0 | Status: DISCONTINUED | OUTPATIENT
Start: 2024-02-17 | End: 2024-02-24

## 2024-02-17 RX ORDER — GABAPENTIN 400 MG/1
300 CAPSULE ORAL THREE TIMES A DAY
Refills: 0 | Status: DISCONTINUED | OUTPATIENT
Start: 2024-02-17 | End: 2024-02-24

## 2024-02-17 RX ORDER — HYDROXYZINE HCL 10 MG
50 TABLET ORAL THREE TIMES A DAY
Refills: 0 | Status: DISCONTINUED | OUTPATIENT
Start: 2024-02-17 | End: 2024-02-24

## 2024-02-17 RX ORDER — CARVEDILOL PHOSPHATE 80 MG/1
12.5 CAPSULE, EXTENDED RELEASE ORAL EVERY 12 HOURS
Refills: 0 | Status: DISCONTINUED | OUTPATIENT
Start: 2024-02-17 | End: 2024-02-24

## 2024-02-17 NOTE — ED BEHAVIORAL HEALTH PROGRESS NOTE - DETAILS:
"I feel my thoughts racing- Anxiety is bad"  cooperative with care  discussed suggestion by telehealth psychiatrist for gabapentin for extreme anxiety  patient agreeable

## 2024-02-17 NOTE — ED ADULT NURSE REASSESSMENT NOTE - NS ED NURSE REASSESS COMMENT FT1
Patient observed comfortable in bed sleeping. He voiced no complains. Patient is awaiting for transfer, no concerns for safety.

## 2024-02-17 NOTE — ED BEHAVIORAL HEALTH PROGRESS NOTE - CASE SUMMARY/FORMULATION (CLEARLY DOCUMENT RATIONALE FOR DISPOSITION CHANGE)
64 yr old male chronic alcoholism recent period of sobriety Chronic anxiety / panic on paroxetine Repeated ED visits due to near panic with suicidal urges which are very disturbing to patient Cleared by cardiology for BH admission  appropriate for voluntary status  will continue paroxetine for now on this for many years  agrees to gabapentin trial for severe anxiety  would avoid benzodiazepines due to high risk of abuse

## 2024-02-17 NOTE — CHART NOTE - NSCHARTNOTEFT_GEN_A_CORE
Plan is for IP psych transfer, vol status. [facility] currently reviewing, asking for Legals and EKG. SW met w/ pt and had legals completed, pt okays transfer to [facility] if accepted. Legals and EKG results faxed to Izzy in Admissions for review. Treatment team aware of status. RANJITH will continue following.

## 2024-02-17 NOTE — ED CDU PROVIDER DISPOSITION NOTE - CLINICAL COURSE
62 yo male; seen in ED within past week, self detoxed from alcohol after approx 15-20 years. Presented for disabling anxiety, same as recent visit 	Never did street drugs not a smoker 	tapered off buspar after last visit Followed up with [facility] around [place]. RX naltrexone which gave him hallucinations and SI. He does not want to die and does nt  have a plan. His anxiety however is disabling and he cannot function.  Meds same as list 4 days ago except buspar is off list and only took one naltrexone pill. seen by psych and accepted for inpatient admission to [facility], Dr. Grimaldo.

## 2024-02-17 NOTE — ED CDU PROVIDER SUBSEQUENT DAY NOTE - CLINICAL SUMMARY MEDICAL DECISION MAKING FREE TEXT BOX
pt cleared by cardiology and medically stable as per previous provider assessment and pt no acute complaints at this time, signed out pending placement, telepsych called about potential placement in the am

## 2024-04-25 ENCOUNTER — APPOINTMENT (OUTPATIENT)
Dept: DERMATOLOGY | Facility: CLINIC | Age: 64
End: 2024-04-25
Payer: COMMERCIAL

## 2024-04-25 PROCEDURE — 99203 OFFICE O/P NEW LOW 30 MIN: CPT

## 2024-05-08 NOTE — ED ADULT NURSE NOTE - BIRTH SEX
Caller: LEVON SCHERER    Relationship: Other    Best call back number: [phone]     Who are you requesting to speak with (clinical staff, provider,  specific staff member): CLINICAL STAFF     What was the call regarding: WAS SEN YESTERDAY AND WAS VERY ANGRY WITH HER BECAUSE SHE ASKED HIM ABOUT HIS MEDICATIONS AND KICKED HER OUT OF HIS HOUSE AND TRIED TO CALL POLICE ON HER STILL GOING TO TRY TO CONTINUE TO SEE PATIENT BUT ALSO STATES PATIENT IS CONFUSED ON HIS MEDICATION AND DID NOT HAVE ALL MEDICATION AVAILABLE NEEDS TO CLARIFY WHAT THE PATIENT SHOULD BE TAKING AND NOT TAKING   STATES HAS NO LISINOPRIL, CRESTOR OR OZEMPIC - STATES BLOOD SUGAR  YESTERDAY     PLEASE CALL AND ADVISE      Male

## 2024-06-04 ENCOUNTER — APPOINTMENT (OUTPATIENT)
Dept: DERMATOLOGY | Facility: CLINIC | Age: 64
End: 2024-06-04

## 2025-06-30 NOTE — ED ADULT NURSE NOTE - SKIN TEMPERATURE MOISTURE
Per pulmonology OV note on 06/03, recommendations to stop azithromycin now.    Please review; protocol failed/No Protocol      Last Office Visit: 05/05/2025   Spoke to Jose (ok per REN) regarding azithromycin refill denial, verbalizes understanding.    Pt encountered in bed in chair mode. warm

## 2025-07-21 ENCOUNTER — APPOINTMENT (OUTPATIENT)
Dept: ORTHOPEDIC SURGERY | Facility: CLINIC | Age: 65
End: 2025-07-21
Payer: MEDICARE

## 2025-07-21 VITALS
SYSTOLIC BLOOD PRESSURE: 110 MMHG | BODY MASS INDEX: 33.32 KG/M2 | HEIGHT: 65 IN | HEART RATE: 80 BPM | WEIGHT: 200 LBS | DIASTOLIC BLOOD PRESSURE: 72 MMHG

## 2025-07-21 DIAGNOSIS — Z96.642 PRESENCE OF LEFT ARTIFICIAL HIP JOINT: ICD-10-CM

## 2025-07-21 DIAGNOSIS — E66.9 OBESITY, UNSPECIFIED: ICD-10-CM

## 2025-07-21 DIAGNOSIS — Z47.1 AFTERCARE FOLLOWING JOINT REPLACEMENT SURGERY: ICD-10-CM

## 2025-07-21 DIAGNOSIS — M17.11 UNILATERAL PRIMARY OSTEOARTHRITIS, RIGHT KNEE: ICD-10-CM

## 2025-07-21 DIAGNOSIS — Z96.642 AFTERCARE FOLLOWING JOINT REPLACEMENT SURGERY: ICD-10-CM

## 2025-07-21 PROCEDURE — 20610 DRAIN/INJ JOINT/BURSA W/O US: CPT | Mod: RT

## 2025-07-21 PROCEDURE — 99204 OFFICE O/P NEW MOD 45 MIN: CPT | Mod: 25

## 2025-07-21 PROCEDURE — 73564 X-RAY EXAM KNEE 4 OR MORE: CPT | Mod: RT

## 2025-08-04 ENCOUNTER — APPOINTMENT (OUTPATIENT)
Dept: DERMATOLOGY | Facility: CLINIC | Age: 65
End: 2025-08-04

## 2025-08-29 ENCOUNTER — APPOINTMENT (OUTPATIENT)
Dept: DERMATOLOGY | Facility: CLINIC | Age: 65
End: 2025-08-29
Payer: MEDICARE

## 2025-08-29 PROCEDURE — 11103 TANGNTL BX SKIN EA SEP/ADDL: CPT | Mod: 59

## 2025-08-29 PROCEDURE — 11104 PUNCH BX SKIN SINGLE LESION: CPT

## 2025-08-29 PROCEDURE — 99203 OFFICE O/P NEW LOW 30 MIN: CPT | Mod: 25

## 2025-09-10 ENCOUNTER — APPOINTMENT (OUTPATIENT)
Dept: DERMATOLOGY | Facility: CLINIC | Age: 65
End: 2025-09-10
Payer: MEDICARE

## 2025-09-10 PROCEDURE — ZZZZZ: CPT
